# Patient Record
Sex: FEMALE | Race: WHITE | NOT HISPANIC OR LATINO | Employment: OTHER | URBAN - METROPOLITAN AREA
[De-identification: names, ages, dates, MRNs, and addresses within clinical notes are randomized per-mention and may not be internally consistent; named-entity substitution may affect disease eponyms.]

---

## 2017-01-19 ENCOUNTER — HOSPITAL ENCOUNTER (INPATIENT)
Facility: MEDICAL CENTER | Age: 82
LOS: 4 days | DRG: 551 | End: 2017-01-24
Attending: EMERGENCY MEDICINE | Admitting: INTERNAL MEDICINE
Payer: MEDICARE

## 2017-01-19 ENCOUNTER — APPOINTMENT (OUTPATIENT)
Dept: RADIOLOGY | Facility: MEDICAL CENTER | Age: 82
DRG: 551 | End: 2017-01-19
Attending: EMERGENCY MEDICINE
Payer: MEDICARE

## 2017-01-19 DIAGNOSIS — N28.9 RENAL INSUFFICIENCY: ICD-10-CM

## 2017-01-19 DIAGNOSIS — R09.02 HYPOXIA: ICD-10-CM
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
## 2017-01-19 DIAGNOSIS — M54.9 PAIN, UPPER BACK: ICD-10-CM

## 2017-01-19 LAB
ALBUMIN SERPL BCP-MCNC: 3.9 G/DL (ref 3.2–4.9)
ALBUMIN/GLOB SERPL: 1.2 G/DL
ALP SERPL-CCNC: 81 U/L (ref 30–99)
ALT SERPL-CCNC: 17 U/L (ref 2–50)
ANION GAP SERPL CALC-SCNC: 10 MMOL/L (ref 0–11.9)
APTT PPP: 27.5 SEC (ref 24.7–36)
AST SERPL-CCNC: 35 U/L (ref 12–45)
BASOPHILS # BLD AUTO: 0.3 % (ref 0–1.8)
BASOPHILS # BLD: 0.03 K/UL (ref 0–0.12)
BILIRUB SERPL-MCNC: 0.5 MG/DL (ref 0.1–1.5)
BUN SERPL-MCNC: 23 MG/DL (ref 8–22)
CALCIUM SERPL-MCNC: 9.1 MG/DL (ref 8.5–10.5)
CHLORIDE SERPL-SCNC: 101 MMOL/L (ref 96–112)
CO2 SERPL-SCNC: 25 MMOL/L (ref 20–33)
CREAT SERPL-MCNC: 1.42 MG/DL (ref 0.5–1.4)
EOSINOPHIL # BLD AUTO: 0.03 K/UL (ref 0–0.51)
EOSINOPHIL NFR BLD: 0.3 % (ref 0–6.9)
ERYTHROCYTE [DISTWIDTH] IN BLOOD BY AUTOMATED COUNT: 45 FL (ref 35.9–50)
GFR SERPL CREATININE-BSD FRML MDRD: 35 ML/MIN/1.73 M 2
GLOBULIN SER CALC-MCNC: 3.3 G/DL (ref 1.9–3.5)
GLUCOSE SERPL-MCNC: 183 MG/DL (ref 65–99)
HCT VFR BLD AUTO: 42.4 % (ref 37–47)
HGB BLD-MCNC: 14.6 G/DL (ref 12–16)
IMM GRANULOCYTES # BLD AUTO: 0.02 K/UL (ref 0–0.11)
IMM GRANULOCYTES NFR BLD AUTO: 0.2 % (ref 0–0.9)
INR PPP: 0.99 (ref 0.87–1.13)
LYMPHOCYTES # BLD AUTO: 0.63 K/UL (ref 1–4.8)
LYMPHOCYTES NFR BLD: 7.1 % (ref 22–41)
MCH RBC QN AUTO: 32.2 PG (ref 27–33)
MCHC RBC AUTO-ENTMCNC: 34.4 G/DL (ref 33.6–35)
MCV RBC AUTO: 93.4 FL (ref 81.4–97.8)
MONOCYTES # BLD AUTO: 0.61 K/UL (ref 0–0.85)
MONOCYTES NFR BLD AUTO: 6.8 % (ref 0–13.4)
NEUTROPHILS # BLD AUTO: 7.59 K/UL (ref 2–7.15)
NEUTROPHILS NFR BLD: 85.3 % (ref 44–72)
NRBC # BLD AUTO: 0 K/UL
NRBC BLD AUTO-RTO: 0 /100 WBC
PLATELET # BLD AUTO: 229 K/UL (ref 164–446)
PMV BLD AUTO: 8.8 FL (ref 9–12.9)
POTASSIUM SERPL-SCNC: 4.7 MMOL/L (ref 3.6–5.5)
PROT SERPL-MCNC: 7.2 G/DL (ref 6–8.2)
PROTHROMBIN TIME: 13.4 SEC (ref 12–14.6)
RBC # BLD AUTO: 4.54 M/UL (ref 4.2–5.4)
SODIUM SERPL-SCNC: 136 MMOL/L (ref 135–145)
WBC # BLD AUTO: 8.9 K/UL (ref 4.8–10.8)

## 2017-01-19 PROCEDURE — 99285 EMERGENCY DEPT VISIT HI MDM: CPT

## 2017-01-19 PROCEDURE — 96360 HYDRATION IV INFUSION INIT: CPT

## 2017-01-19 PROCEDURE — 304561 HCHG STAT O2

## 2017-01-19 PROCEDURE — 85730 THROMBOPLASTIN TIME PARTIAL: CPT

## 2017-01-19 PROCEDURE — 85610 PROTHROMBIN TIME: CPT

## 2017-01-19 PROCEDURE — 80053 COMPREHEN METABOLIC PANEL: CPT

## 2017-01-19 PROCEDURE — 85025 COMPLETE CBC W/AUTO DIFF WBC: CPT

## 2017-01-19 PROCEDURE — 36415 COLL VENOUS BLD VENIPUNCTURE: CPT

## 2017-01-19 PROCEDURE — 700105 HCHG RX REV CODE 258: Performed by: EMERGENCY MEDICINE

## 2017-01-19 PROCEDURE — 85379 FIBRIN DEGRADATION QUANT: CPT

## 2017-01-19 PROCEDURE — 94760 N-INVAS EAR/PLS OXIMETRY 1: CPT

## 2017-01-19 PROCEDURE — 304562 HCHG STAT O2 MASK/CANNULA

## 2017-01-19 RX ORDER — SODIUM CHLORIDE 9 MG/ML
500 INJECTION, SOLUTION INTRAVENOUS ONCE
Status: COMPLETED | OUTPATIENT
Start: 2017-01-19 | End: 2017-01-19

## 2017-01-19 RX ADMIN — SODIUM CHLORIDE 500 ML: 9 INJECTION, SOLUTION INTRAVENOUS at 23:00

## 2017-01-19 ASSESSMENT — LIFESTYLE VARIABLES: DO YOU DRINK ALCOHOL: NO

## 2017-01-19 ASSESSMENT — PAIN SCALES - GENERAL
PAINLEVEL_OUTOF10: 0
PAINLEVEL_OUTOF10: 0

## 2017-01-19 NOTE — IP AVS SNAPSHOT
First Active Media Access Code: [email or web address]  Expires: 2/23/2017  6:19 PM    First Active Media  A secure, online tool to manage your health information     [employer]’s First Active Media® is a secure, online tool that connects you to your personalized health information from the privacy of your home -- day or night - making it very easy for you to manage your healthcare. Once the activation process is completed, you can even access your medical information using the First Active Media bill, which is available for free in the Apple Bill store or Google Play store.     First Active Media provides the following levels of access (as shown below):   My Chart Features   [facility] Primary Care Doctor [facility]  Specialists [facility]  Urgent  Care Non-[facility]  Primary Care  Doctor   Email your healthcare team securely and privately 24/7 X X X X   Manage appointments: schedule your next appointment; view details of past/upcoming appointments X      Request prescription refills. X      View recent personal medical records, including lab and immunizations X X X X   View health record, including health history, allergies, medications X X X X   Read reports about your outpatient visits, procedures, consult and ER notes X X X X   See your discharge summary, which is a recap of your hospital and/or ER visit that includes your diagnosis, lab results, and care plan. X X       How to register for First Active Media:  1. Go to  [email or web address].  2. Click on the Sign Up Now box, which takes you to the New Member Sign Up page. You will need to provide the following information:  a. Enter your First Active Media Access Code exactly as it appears at the top of this page. (You will not need to use this code after you’ve completed the sign-up process. If you do not sign up before the expiration date, you must request a new code.)   b. Enter your date of birth.   c. Enter your home email address.   d. Click Submit, and follow the next screen’s instructions.  3. Create a First Active Media ID. This will be your [email or web address]  login ID and cannot be changed, so think of one that is secure and easy to remember.  4. Create a [employer] password. You can change your password at any time.  5. Enter your Password Reset Question and Answer. This can be used at a later time if you forget your password.   6. Enter your e-mail address. This allows you to receive e-mail notifications when new information is available in [employer].  7. Click Sign Up. You can now view your health information.    For assistance activating your [employer] account, call [phone]

## 2017-01-19 NOTE — IP AVS SNAPSHOT
" After Visit Summary                                                                                                                  Name:Pau Escalera  Medical Record Number:[id number]  CSN: [id number]    YOB: 1926   Age: 89 y.o.  Sex: female  HT:1.499 m (4' 11.02\") WT: 64.2 kg (141 lb 8.6 oz)          Admit Date: 1/19/2017     Discharge Date: 1/24/2017  Today's Date: 1/25/2017  Attending Doctor:  No att. providers found                  Allergies:  Review of patient's allergies indicates no known allergies.            Discharge Instructions       Discharge Instructions    Discharged to other by [facility] with escort. Discharged via wheelchair, hospital escort: Yes.  Special equipment needed: Not Applicable    Be sure to schedule a follow-up appointment with your primary care doctor or any specialists as instructed.     Discharge Plan:   Influenza Vaccine Indication: Patient Refuses    I understand that a diet low in cholesterol, fat, and sodium is recommended for good health. Unless I have been given specific instructions below for another diet, I accept this instruction as my diet prescription.   Other diet: regular diet    Special Instructions: None    · Is patient discharged on Warfarin / Coumadin?   No     · Is patient Post Blood Transfusion?  No    Depression / Suicide Risk    As you are discharged from this [facility] facility, it is important to learn how to keep safe from harming yourself.    Recognize the warning signs:  · Abrupt changes in personality, positive or negative- including increase in energy   · Giving away possessions  · Change in eating patterns- significant weight changes-  positive or negative  · Change in sleeping patterns- unable to sleep or sleeping all the time   · Unwillingness or inability to communicate  · Depression  · Unusual sadness, discouragement and loneliness  · Talk of wanting to die  · Neglect of personal appearance   · Rebelliousness- reckless " behavior  · Withdrawal from people/activities they love  · Confusion- inability to concentrate     If you or a loved one observes any of these behaviors or has concerns about self-harm, here's what you can do:  · Talk about it- your feelings and reasons for harming yourself  · Remove any means that you might use to hurt yourself (examples: pills, rope, extension cords, firearm)  · Get professional help from the community (Mental Health, Substance Abuse, psychological counseling)  · Do not be alone:Call your Safe Contact- someone whom you trust who will be there for you.  · Call your local CRISIS HOTLINE 357-1386 or 963-865-9930  · Call your local Children's Mobile Crisis Response Team [place] (246) 116-9427 or www.[employer]  · Call the toll free National Suicide Prevention Hotlines   · National Suicide Prevention Lifeline 066-614-CQAE (7677)  · [employer] Network 800-SUICIDE (471-1933)        Your appointments     Jan 25, 2017  8:15 AM   Adult Draw/Collection with LAB SKILLED NURSING   LAB - SKILLED NURSING (--)    [address] [address] [phone]              Follow-up Information     1. Follow up with Thong Alvarez D.O. In 4 weeks.    Specialty:  Family Medicine    Why:  Hospital follow-up appointment with PCP    Contact information    [address]   [address]  [facility] [address] [phone]          2. Follow up with Markus Wong M.D. In 2 weeks.    Specialty:  Neurosurgery    Contact information    [address] [facility]  [address]  [facility] [id number] [phone]           Discharge Medication Instructions:    Below are the medications your physician expects you to take upon discharge:    Review all your home medications and newly ordered medications with your doctor and/or pharmacist. Follow medication instructions as directed by your doctor and/or pharmacist.    Please keep your medication list with you and share with your physician.               Medication List      START  taking these medications        Instructions     MG Caps   Last time this was given:  100 mg on 1/24/2017  8:49 AM   Next Dose Due:  Tomorrow am with breakfast    Take 100 mg by mouth every morning.   Dose:  100 mg       gabapentin 100 MG Caps   Last time this was given:  100 mg on 1/24/2017  2:59 PM   Commonly known as:  NEURONTIN   Next Dose Due:  Tonight at bedtime    Take 1 Cap by mouth 3 times a day.   Dose:  100 mg       lactulose 20 GM/30ML Soln   Last time this was given:  30 mL on 1/22/2017  9:14 PM   Next Dose Due:  See instruction lactulose followed by suppository and then by fleets enema if no BM this is protocol here if colace and Senakot do not work    Take 30 mL by mouth every 24 hours as needed (if sennosides-docusate sodium (SENOKOT-S) ineffective).   Dose:  30 mL       senna-docusate 8.6-50 MG Tabs   Last time this was given:  1 Tab on 1/23/2017  9:42 PM   Commonly known as:  PERICOLACE or SENOKOT S   Next Dose Due:  Tonight at bedtime    Take 1 Tab by mouth every day.   Dose:  1 Tab         CONTINUE taking these medications        Instructions    acetaminophen 500 MG Tabs   Last time this was given:  650 mg on 1/22/2017  3:25 AM   Commonly known as:  TYLENOL   Next Dose Due:  As needed for pain (rt shoulder has been hurting but she declined tylenol offered)    Take 500 mg by mouth every four hours as needed (generlized pain).   Dose:  500 mg       artificial tears 1.4 % Soln   Next Dose Due:  Out-pt med was not ordered in hospital    Place 1 Drop in both eyes 2 Times a Day.   Dose:  1 Drop       clopidogrel 75 MG Tabs   Last time this was given:  75 mg on 1/24/2017  8:49 AM   Commonly known as:  PLAVIX   Next Dose Due:  Due again in am with breakfast    Take 75 mg by mouth every morning.   Dose:  75 mg       magnesium oxide 400 MG Tabs   Commonly known as:  MAG-OX   Next Dose Due:  Out pt med not given in hospital    Take 400 mg by mouth every evening.   Dose:  400 mg       simvastatin 40  MG Tabs   Last time this was given:  40 mg on 1/23/2017  9:42 PM   Commonly known as:  ZOCOR   Next Dose Due:  Due again tonight at bedtime    Take 40 mg by mouth every evening.   Dose:  40 mg       tramadol 50 MG Tabs   Commonly known as:  ULTRAM   Next Dose Due:  As needed for pain has declined pain meds so far today    Take 50 mg by mouth every 6 hours as needed for Moderate Pain.   Dose:  50 mg       VIACTIV 500-500-40 MG-UNT-MCG Chew   Generic drug:  Calcium-Vitamin D-Vitamin K   Next Dose Due:  See below this too was not ordered in hospital    Take 1 Tab by mouth every evening.   Dose:  1 Tab         STOP taking these medications     CALCIUM + D PO               Instructions           Diet / Nutrition:    Follow any diet instructions given to you by your doctor or the dietician, including how much salt (sodium) you are allowed each day.    If you are overweight, talk to your doctor about a weight reduction plan.    Activity:    Remain physically active following your doctor's instructions about exercise and activity.    Rest often.     Any time you become even a little tired or short of breath, SIT DOWN and rest.    Worsening Symptoms:    Report any of the following signs and symptoms to the doctor's office immediately:    *Pain of jaw, arm, or neck  *Chest pain not relieved by medication                               *Dizziness or loss of consciousness  *Difficulty breathing even when at rest   *More tired than usual                                       *Bleeding drainage or swelling of surgical site  *Swelling of feet, ankles, legs or stomach                 *Fever (>100ºF)  *Pink or blood tinged sputum  *Weight gain (3lbs/day or 5lbs /week)           *Shock from internal defibrillator (if applicable)  *Palpitations or irregular heartbeats                *Cool and/or numb extremities    Stroke Awareness    Common Risk Factors for Stroke include:    Age  Atrial Fibrillation  Carotid Artery Stenosis  Diabetes  Mellitus  Excessive alcohol consumption  High blood pressure  Overweight   Physical inactivity  Smoking    Warning signs and symptoms of a stroke include:    *Sudden numbness or weakness of the face, arm or leg (especially on one side of the body).  *Sudden confusion, trouble speaking or understanding.  *Sudden trouble seeing in one or both eyes.  *Sudden trouble walking, dizziness, loss of balance or coordination.Sudden severe headache with no known cause.    It is very important to get treatment quickly when a stroke occurs. If you experience any of the above warning signs, call 911 immediately.                   Disclaimer         Quit Smoking / Tobacco Use:    I understand the use of any tobacco products increases my chance of suffering from future heart disease or stroke and could cause other illnesses which may shorten my life. Quitting the use of tobacco products is the single most important thing I can do to improve my health. For further information on smoking / tobacco cessation call a Toll Free Quit Line at 1-353.126.6727 (*National Cancer [employer]) or 1-590.878.9793 (American Lung Association) or you can access the web based program at www.lungQualMetrix.org.    [place] Tobacco Users Help Line:  (518) 399-8957       Toll Free: 1-778.780.3209  Quit Tobacco Program [facility] (011)274-9730    Crisis Hotline:    [place] Crisis Hotline:  4-905-CLRYLHD or 1-494.661.2412    [place] Crisis Hotline:    1-362.828.9723 or 423-565-4150    Discharge Survey:   Thank you for choosing [facility]. We hope we did everything we could to make your hospital stay a pleasant one. You may be receiving a phone survey and we would appreciate your time and participation in answering the questions. Your input is very valuable to us in our efforts to improve our service to our patients and their families.        My signature on this form indicates that:    1. I have reviewed and understand the above  information.  2. My questions regarding this information have been answered to my satisfaction.  3. I have formulated a plan with my discharge nurse to obtain my prescribed medications for home.                  Disclaimer         __________________________________                     __________       ________                       Patient Signature                                                 Date                    Time

## 2017-01-19 NOTE — IP AVS SNAPSHOT
1/25/2017          [address] [place] [address]    Dear Pau:    [facility] wants to ensure your discharge home is safe and you or your loved ones have had all your questions answered regarding your care after you leave the hospital.    You may receive a telephone call within two days of your discharge.  This call is to make certain you understand your discharge instructions as well as ensure we provided you with the best care possible during your stay with us.     The call will only last approximately 3-5 minutes and will be done by a nurse.    Once again, we want to ensure your discharge home is safe and that you have a clear understanding of any next steps in your care.  If you have any questions or concerns, please do not hesitate to contact us, we are here for you.  Thank you for choosing [facility] [place] for your healthcare needs.    Sincerely,    Epi Malin    [facility]

## 2017-01-19 NOTE — IP AVS SNAPSHOT
" <p align=\"LEFT\"><IMG SRC=\"//EMRWB/blob$/Images/[email or web address]\" alt=\"Image\" WIDTH=\"50%\" HEIGHT=\"200\" BORDER=\"\"></p>                   Name:Pau Escalera  Medical Record Number:[id number]  CSN: [id number]    YOB: 1926   Age: 89 y.o.  Sex: female  HT:1.499 m (4' 11.02\") WT: 64.2 kg (141 lb 8.6 oz)          Admit Date: 1/19/2017     Discharge Date: 1/24/2017  Today's Date: 1/25/2017  Attending Doctor:  No att. providers found                  Allergies:  Review of patient's allergies indicates no known allergies.          Your appointments     Jan 25, 2017  8:15 AM   Adult Draw/Collection with LAB SKILLED NURSING   LAB - SKILLED NURSING (--)    [address] [facility] [id number] [phone]              Follow-up Information     1. Follow up with Thong Alvarez D.O. In 4 weeks.    Specialty:  Family Medicine    Why:  Hospital follow-up appointment with PCP    Contact information    [address]   [address]  [facility] [id number] [phone]          2. Follow up with Markus Wong M.D. In 2 weeks.    Specialty:  Neurosurgery    Contact information    [address] [facility]  [address]  [facility] [id number] [phone]           Medication List      Take these Medications        Instructions    acetaminophen 500 MG Tabs   Commonly known as:  TYLENOL    Take 500 mg by mouth every four hours as needed (generlized pain).   Dose:  500 mg       artificial tears 1.4 % Soln    Place 1 Drop in both eyes 2 Times a Day.   Dose:  1 Drop       clopidogrel 75 MG Tabs   Commonly known as:  PLAVIX    Take 75 mg by mouth every morning.   Dose:  75 mg        MG Caps    Take 100 mg by mouth every morning.   Dose:  100 mg       gabapentin 100 MG Caps   Commonly known as:  NEURONTIN    Take 1 Cap by mouth 3 times a day.   Dose:  100 mg       lactulose 20 GM/30ML Soln    Take 30 mL by mouth every 24 hours as needed (if sennosides-docusate sodium (SENOKOT-S) ineffective).   Dose:  30 mL       magnesium oxide 400 MG Tabs   Commonly " known as:  MAG-OX    Take 400 mg by mouth every evening.   Dose:  400 mg       senna-docusate 8.6-50 MG Tabs   Commonly known as:  PERICOLACE or SENOKOT S    Take 1 Tab by mouth every day.   Dose:  1 Tab       simvastatin 40 MG Tabs   Commonly known as:  ZOCOR    Take 40 mg by mouth every evening.   Dose:  40 mg       tramadol 50 MG Tabs   Commonly known as:  ULTRAM    Take 50 mg by mouth every 6 hours as needed for Moderate Pain.   Dose:  50 mg       VIACTIV 500-500-40 MG-UNT-MCG Chew   Generic drug:  Calcium-Vitamin D-Vitamin K    Take 1 Tab by mouth every evening.   Dose:  1 Tab

## 2017-01-20 ENCOUNTER — RESOLUTE PROFESSIONAL BILLING HOSPITAL PROF FEE (OUTPATIENT)
Dept: HOSPITALIST | Facility: MEDICAL CENTER | Age: 82
End: 2017-01-20
Payer: MEDICARE

## 2017-01-20 ENCOUNTER — APPOINTMENT (OUTPATIENT)
Dept: RADIOLOGY | Facility: MEDICAL CENTER | Age: 82
DRG: 551 | End: 2017-01-20
Attending: INTERNAL MEDICINE
Payer: MEDICARE
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
## 2017-01-20 PROBLEM — J96.01 ACUTE RESPIRATORY FAILURE WITH HYPOXIA (HCC): Status: ACTIVE | Noted: 2017-01-20

## 2017-01-20 LAB
DEPRECATED D DIMER PPP IA-ACNC: 553 NG/ML(D-DU)
FLUAV H1 2009 PAND RNA SPEC QL NAA+PROBE: NOT DETECTED
FLUAV RNA SPEC QL NAA+PROBE: NEGATIVE
FLUBV RNA SPEC QL NAA+PROBE: NEGATIVE

## 2017-01-20 PROCEDURE — G8988 SELF CARE GOAL STATUS: HCPCS | Mod: CK

## 2017-01-20 PROCEDURE — 700111 HCHG RX REV CODE 636 W/ 250 OVERRIDE (IP): Performed by: INTERNAL MEDICINE

## 2017-01-20 PROCEDURE — 700105 HCHG RX REV CODE 258: Performed by: INTERNAL MEDICINE

## 2017-01-20 PROCEDURE — A9567 TECHNETIUM TC-99M AEROSOL: HCPCS

## 2017-01-20 PROCEDURE — 97166 OT EVAL MOD COMPLEX 45 MIN: CPT

## 2017-01-20 PROCEDURE — 72070 X-RAY EXAM THORAC SPINE 2VWS: CPT

## 2017-01-20 PROCEDURE — 700102 HCHG RX REV CODE 250 W/ 637 OVERRIDE(OP): Performed by: INTERNAL MEDICINE

## 2017-01-20 PROCEDURE — 93970 EXTREMITY STUDY: CPT

## 2017-01-20 PROCEDURE — 700112 HCHG RX REV CODE 229: Performed by: INTERNAL MEDICINE

## 2017-01-20 PROCEDURE — A9270 NON-COVERED ITEM OR SERVICE: HCPCS | Performed by: INTERNAL MEDICINE

## 2017-01-20 PROCEDURE — 99223 1ST HOSP IP/OBS HIGH 75: CPT | Mod: AI | Performed by: INTERNAL MEDICINE

## 2017-01-20 PROCEDURE — 87502 INFLUENZA DNA AMP PROBE: CPT

## 2017-01-20 PROCEDURE — G8978 MOBILITY CURRENT STATUS: HCPCS | Mod: CL

## 2017-01-20 PROCEDURE — 72100 X-RAY EXAM L-S SPINE 2/3 VWS: CPT

## 2017-01-20 PROCEDURE — 770006 HCHG ROOM/CARE - MED/SURG/GYN SEMI*

## 2017-01-20 PROCEDURE — 97163 PT EVAL HIGH COMPLEX 45 MIN: CPT

## 2017-01-20 PROCEDURE — 87503 INFLUENZA DNA AMP PROB ADDL: CPT

## 2017-01-20 PROCEDURE — G8987 SELF CARE CURRENT STATUS: HCPCS | Mod: CL

## 2017-01-20 PROCEDURE — 99232 SBSQ HOSP IP/OBS MODERATE 35: CPT | Performed by: INTERNAL MEDICINE

## 2017-01-20 PROCEDURE — 96361 HYDRATE IV INFUSION ADD-ON: CPT

## 2017-01-20 PROCEDURE — 700105 HCHG RX REV CODE 258: Performed by: EMERGENCY MEDICINE

## 2017-01-20 PROCEDURE — G8979 MOBILITY GOAL STATUS: HCPCS | Mod: CK

## 2017-01-20 PROCEDURE — 71250 CT THORAX DX C-: CPT

## 2017-01-20 PROCEDURE — 93970 EXTREMITY STUDY: CPT | Mod: 26 | Performed by: SURGERY

## 2017-01-20 RX ORDER — ENEMA 19; 7 G/133ML; G/133ML
1 ENEMA RECTAL
Status: DISCONTINUED | OUTPATIENT
Start: 2017-01-20 | End: 2017-01-24 | Stop reason: HOSPADM

## 2017-01-20 RX ORDER — ONDANSETRON 2 MG/ML
4 INJECTION INTRAMUSCULAR; INTRAVENOUS EVERY 4 HOURS PRN
Status: DISCONTINUED | OUTPATIENT
Start: 2017-01-20 | End: 2017-01-24 | Stop reason: HOSPADM

## 2017-01-20 RX ORDER — TRAMADOL HYDROCHLORIDE 50 MG/1
50 TABLET ORAL EVERY 6 HOURS PRN
Status: ON HOLD | COMMUNITY
End: 2017-01-20

## 2017-01-20 RX ORDER — POLYVINYL ALCOHOL 14 MG/ML
1 SOLUTION/ DROPS OPHTHALMIC 2 TIMES DAILY
Status: ON HOLD | COMMUNITY
End: 2017-01-20

## 2017-01-20 RX ORDER — GUAIFENESIN 600 MG/1
600 TABLET, EXTENDED RELEASE ORAL
Status: ON HOLD | COMMUNITY
End: 2017-01-20

## 2017-01-20 RX ORDER — SIMVASTATIN 20 MG
40 TABLET ORAL DAILY
Status: DISCONTINUED | OUTPATIENT
Start: 2017-01-20 | End: 2017-01-24 | Stop reason: HOSPADM

## 2017-01-20 RX ORDER — KETOTIFEN FUMARATE 0.25 MG/ML
1 SOLUTION/ DROPS OPHTHALMIC 2 TIMES DAILY PRN
Status: ON HOLD | COMMUNITY
End: 2017-01-20

## 2017-01-20 RX ORDER — SODIUM CHLORIDE 9 MG/ML
INJECTION, SOLUTION INTRAVENOUS CONTINUOUS
Status: DISCONTINUED | OUTPATIENT
Start: 2017-01-20 | End: 2017-01-22

## 2017-01-20 RX ORDER — CLOPIDOGREL BISULFATE 75 MG/1
75 TABLET ORAL DAILY
Status: DISCONTINUED | OUTPATIENT
Start: 2017-01-20 | End: 2017-01-24 | Stop reason: HOSPADM

## 2017-01-20 RX ORDER — HEPARIN SODIUM 5000 [USP'U]/ML
5000 INJECTION, SOLUTION INTRAVENOUS; SUBCUTANEOUS EVERY 8 HOURS
Status: DISCONTINUED | OUTPATIENT
Start: 2017-01-20 | End: 2017-01-24 | Stop reason: HOSPADM

## 2017-01-20 RX ORDER — DOCUSATE SODIUM 100 MG/1
100 CAPSULE, LIQUID FILLED ORAL EVERY MORNING
Status: DISCONTINUED | OUTPATIENT
Start: 2017-01-20 | End: 2017-01-24 | Stop reason: HOSPADM

## 2017-01-20 RX ORDER — GABAPENTIN 100 MG/1
100 CAPSULE ORAL 3 TIMES DAILY
Status: DISCONTINUED | OUTPATIENT
Start: 2017-01-20 | End: 2017-01-24 | Stop reason: HOSPADM

## 2017-01-20 RX ORDER — CLOPIDOGREL BISULFATE 75 MG/1
75 TABLET ORAL EVERY MORNING
COMMUNITY
End: 2020-02-13

## 2017-01-20 RX ORDER — MAGNESIUM OXIDE 400 MG/1
400 TABLET ORAL DAILY
Status: ON HOLD | COMMUNITY
End: 2017-01-20

## 2017-01-20 RX ORDER — IBUPROFEN 200 MG
600 TABLET ORAL EVERY 6 HOURS PRN
Status: ON HOLD | COMMUNITY
End: 2017-01-24

## 2017-01-20 RX ORDER — LACTULOSE 20 G/30ML
30 SOLUTION ORAL
Status: DISCONTINUED | OUTPATIENT
Start: 2017-01-20 | End: 2017-01-24 | Stop reason: HOSPADM

## 2017-01-20 RX ORDER — IBUPROFEN 200 MG
600 TABLET ORAL EVERY 6 HOURS PRN
Status: ON HOLD | COMMUNITY
End: 2017-01-20

## 2017-01-20 RX ORDER — LABETALOL HYDROCHLORIDE 5 MG/ML
10 INJECTION, SOLUTION INTRAVENOUS EVERY 4 HOURS PRN
Status: DISCONTINUED | OUTPATIENT
Start: 2017-01-20 | End: 2017-01-24 | Stop reason: HOSPADM

## 2017-01-20 RX ORDER — TRAMADOL HYDROCHLORIDE 50 MG/1
50 TABLET ORAL EVERY 6 HOURS PRN
Status: ON HOLD | COMMUNITY
End: 2020-02-13 | Stop reason: SDUPTHER

## 2017-01-20 RX ORDER — TEMAZEPAM 15 MG/1
15 CAPSULE ORAL NIGHTLY PRN
Status: ON HOLD | COMMUNITY
End: 2017-01-20

## 2017-01-20 RX ORDER — AMOXICILLIN 250 MG
1 CAPSULE ORAL NIGHTLY
Status: DISCONTINUED | OUTPATIENT
Start: 2017-01-20 | End: 2017-01-24 | Stop reason: HOSPADM

## 2017-01-20 RX ORDER — ONDANSETRON 4 MG/1
4 TABLET, ORALLY DISINTEGRATING ORAL EVERY 4 HOURS PRN
Status: DISCONTINUED | OUTPATIENT
Start: 2017-01-20 | End: 2017-01-24 | Stop reason: HOSPADM

## 2017-01-20 RX ORDER — BISACODYL 5 MG
10 TABLET, DELAYED RELEASE (ENTERIC COATED) ORAL
Status: ON HOLD | COMMUNITY
End: 2017-01-20

## 2017-01-20 RX ORDER — ACETAMINOPHEN 500 MG
500 TABLET ORAL EVERY 4 HOURS PRN
COMMUNITY
End: 2020-02-13

## 2017-01-20 RX ORDER — SIMVASTATIN 40 MG
40 TABLET ORAL
COMMUNITY
End: 2020-02-13

## 2017-01-20 RX ORDER — ACETAMINOPHEN 325 MG/1
500 TABLET ORAL EVERY 4 HOURS PRN
Status: ON HOLD | COMMUNITY
End: 2017-01-20

## 2017-01-20 RX ORDER — MAGNESIUM OXIDE 400 MG/1
400 TABLET ORAL EVERY EVENING
COMMUNITY
Start: 2020-02-13

## 2017-01-20 RX ORDER — AMOXICILLIN 250 MG
1 CAPSULE ORAL
Status: DISCONTINUED | OUTPATIENT
Start: 2017-01-20 | End: 2017-01-24 | Stop reason: HOSPADM

## 2017-01-20 RX ORDER — CRANBERRY FRUIT EXTRACT 250 MG
1 CAPSULE ORAL 2 TIMES DAILY
Status: ON HOLD | COMMUNITY
End: 2017-01-24

## 2017-01-20 RX ORDER — POLYVINYL ALCOHOL 14 MG/ML
1 SOLUTION/ DROPS OPHTHALMIC 3 TIMES DAILY PRN
COMMUNITY
Start: 2020-02-13

## 2017-01-20 RX ORDER — SODIUM CHLORIDE 9 MG/ML
1000 INJECTION, SOLUTION INTRAVENOUS ONCE
Status: COMPLETED | OUTPATIENT
Start: 2017-01-20 | End: 2017-01-20

## 2017-01-20 RX ORDER — BISACODYL 10 MG
10 SUPPOSITORY, RECTAL RECTAL
Status: DISCONTINUED | OUTPATIENT
Start: 2017-01-20 | End: 2017-01-24 | Stop reason: HOSPADM

## 2017-01-20 RX ORDER — ACETAMINOPHEN 325 MG/1
650 TABLET ORAL EVERY 6 HOURS PRN
Status: DISCONTINUED | OUTPATIENT
Start: 2017-01-20 | End: 2017-01-24 | Stop reason: HOSPADM

## 2017-01-20 RX ADMIN — ACETAMINOPHEN 650 MG: 325 TABLET, FILM COATED ORAL at 21:16

## 2017-01-20 RX ADMIN — SODIUM CHLORIDE 1000 ML: 9 INJECTION, SOLUTION INTRAVENOUS at 01:45

## 2017-01-20 RX ADMIN — HEPARIN SODIUM 5000 UNITS: 5000 INJECTION INTRAVENOUS; SUBCUTANEOUS at 14:57

## 2017-01-20 RX ADMIN — GABAPENTIN 100 MG: 100 CAPSULE ORAL at 12:16

## 2017-01-20 RX ADMIN — GABAPENTIN 100 MG: 100 CAPSULE ORAL at 20:52

## 2017-01-20 RX ADMIN — HEPARIN SODIUM 5000 UNITS: 5000 INJECTION INTRAVENOUS; SUBCUTANEOUS at 08:36

## 2017-01-20 RX ADMIN — CLOPIDOGREL 75 MG: 75 TABLET, FILM COATED ORAL at 08:35

## 2017-01-20 RX ADMIN — DOCUSATE SODIUM 100 MG: 100 CAPSULE ORAL at 08:35

## 2017-01-20 RX ADMIN — ACETAMINOPHEN 650 MG: 325 TABLET, FILM COATED ORAL at 11:52

## 2017-01-20 RX ADMIN — SODIUM CHLORIDE: 9 INJECTION, SOLUTION INTRAVENOUS at 08:37

## 2017-01-20 RX ADMIN — SIMVASTATIN 40 MG: 20 TABLET, FILM COATED ORAL at 20:52

## 2017-01-20 RX ADMIN — STANDARDIZED SENNA CONCENTRATE AND DOCUSATE SODIUM 1 TABLET: 8.6; 5 TABLET, FILM COATED ORAL at 20:52

## 2017-01-20 RX ADMIN — HEPARIN SODIUM 5000 UNITS: 5000 INJECTION INTRAVENOUS; SUBCUTANEOUS at 20:52

## 2017-01-20 ASSESSMENT — PATIENT HEALTH QUESTIONNAIRE - PHQ9
2. FEELING DOWN, DEPRESSED, IRRITABLE, OR HOPELESS: NOT AT ALL
SUM OF ALL RESPONSES TO PHQ QUESTIONS 1-9: 0
SUM OF ALL RESPONSES TO PHQ9 QUESTIONS 1 AND 2: 0
SUM OF ALL RESPONSES TO PHQ9 QUESTIONS 1 AND 2: 0
SUM OF ALL RESPONSES TO PHQ QUESTIONS 1-9: 0
1. LITTLE INTEREST OR PLEASURE IN DOING THINGS: NOT AT ALL
2. FEELING DOWN, DEPRESSED, IRRITABLE, OR HOPELESS: NOT AT ALL
1. LITTLE INTEREST OR PLEASURE IN DOING THINGS: NOT AT ALL

## 2017-01-20 ASSESSMENT — ACTIVITIES OF DAILY LIVING (ADL): TOILETING: DEPENDENT

## 2017-01-20 ASSESSMENT — LIFESTYLE VARIABLES
EVER_SMOKED: NEVER
EVER_SMOKED: NEVER
DO YOU DRINK ALCOHOL: NO
ALCOHOL_USE: NO
EVER_SMOKED: NEVER

## 2017-01-20 ASSESSMENT — ENCOUNTER SYMPTOMS
SHORTNESS OF BREATH: 1
BACK PAIN: 1
DIZZINESS: 0
CHILLS: 0
HEADACHES: 0
FEVER: 0
PALPITATIONS: 0
COUGH: 0
ABDOMINAL PAIN: 0
DIAPHORESIS: 0
NAUSEA: 0

## 2017-01-20 ASSESSMENT — COPD QUESTIONNAIRES
HAVE YOU SMOKED AT LEAST 100 CIGARETTES IN YOUR ENTIRE LIFE: NO/DON'T KNOW
DURING THE PAST 4 WEEKS HOW MUCH DID YOU FEEL SHORT OF BREATH: NONE/LITTLE OF THE TIME
DO YOU EVER COUGH UP ANY MUCUS OR PHLEGM?: NO/ONLY WITH OCCASIONAL COLDS OR INFECTIONS
COPD SCREENING SCORE: 2

## 2017-01-20 ASSESSMENT — GAIT ASSESSMENTS: GAIT LEVEL OF ASSIST: UNABLE TO PARTICIPATE

## 2017-01-20 ASSESSMENT — PAIN SCALES - GENERAL: PAINLEVEL_OUTOF10: 4

## 2017-01-20 NOTE — ED NOTES
Floor called and notified of transport.  Pau Lali transported to S1 via gurney with transport. All personal belongings in possession.  NAD noted.

## 2017-01-20 NOTE — ED PROVIDER NOTES
"ED Provider Note    Scribed for Samy Gale M.D. by Aaliyah Hernandez. 1/19/2017  10:30 PM    Primary care provider: Thong Alvarez D.O.  Means of arrival: EMS  History obtained from: Patient/ Nursing  History limited by: None    CHIEF COMPLAINT  Chief Complaint   Patient presents with   • GLF     Pt non-ambulatory, fell out of chair while brushing teeth this evening.   • Back Pain     left flank pain.     RICHARD Escalera is a 89 y.o. female who presents to the Emergency Department by ambulance following a ground level fall which occurred prior to arrival. Per nursing, the patient fell out of her wheel chair while brushing her teeth this evening. She initially complained of left flank pain to EMS. She denies hitting her head upon falling and loss of consciousness.  Patient also denies neck pain, chest pain, shortness of breath and abdominal pain. Per patient, she does not currently have any pain. She states she was in the ED 2-3 days ago and was told her has \"bruising inside\" and she intermittent experiences pain from this. 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 Upon arrival, the patient was hypoxic on room air.     REVIEW OF SYSTEMS  Pertinent positives include ground level fall. Pertinent negatives include no neck pain, chest pain, shortness of breath and abdominal pain.  All other systems reviewed and negative.    PAST MEDICAL HISTORY   has a past medical history of Arthritis; Unspecified urinary incontinence; Urinary bladder disorder; Snoring; Stroke (CMS-[facility]) (2006); Cancer (CMS-HCC) (2013); Cold; and CATARACT.    SURGICAL HISTORY   has past surgical history that includes carotid endarterectomy; cataract extraction with iol; and carotid endarterectomy (12/13/2013).    SOCIAL HISTORY  Social History   Substance Use Topics   • Smoking status: Never Smoker    • Smokeless tobacco: Never Used   • Alcohol Use: Yes      Comment: 3 per week      History   Drug Use No     FAMILY " "HISTORY  None noted during this encounter.     CURRENT MEDICATIONS  Home Medications     **Home medications have not yet been reviewed for this encounter**        ALLERGIES  No Known Allergies    PHYSICAL EXAM  VITAL SIGNS: /72 mmHg  Pulse 60  Temp(Src) 36.4 °C (97.6 °F)  Resp 16  Ht 1.499 m (4' 11\")  Wt 63.504 kg (140 lb)  BMI 28.26 kg/m2  SpO2 97%    Constitutional: Well developed, Well nourished, mild distress, Non-toxic appearance.   HENT: Normocephalic, Atraumatic, Bilateral external ears normal, Oropharynx moist, No oral exudates.   Eyes: PERRLA, EOMI, Conjunctiva normal, No discharge.   Neck: No tenderness, Supple, No stridor.   Lymphatic: No lymphadenopathy noted.   Cardiovascular: Normal heart rate, Normal rhythm.   Thorax & Lungs: Clear to auscultation bilaterally, No respiratory distress, No wheezing, No crackles. Abrasion/ contusion to left posterior rib cage.  Abdomen: Soft, No tenderness, No masses, No pulsatile masses.   Skin: Warm, Dry, No erythema, No rash.   Extremities:, No edema No cyanosis.   Musculoskeletal: Atrophy of left arm and left leg. No tenderness to palpation or major deformities noted.  Intact distal pulses  Back: No midline T-spine or L-spine tenderness.   Neurologic: Awake and alert but confused. Moves all extremities spontaneously.  Psychiatric: Affect normal, Judgment normal, Mood normal.     LABS  Labs Reviewed   CBC WITH DIFFERENTIAL - Abnormal; Notable for the following:     MPV 8.8 (*)     Neutrophils-Polys 85.30 (*)     Lymphocytes 7.10 (*)     Neutrophils (Absolute) 7.59 (*)     Lymphs (Absolute) 0.63 (*)     All other components within normal limits    Narrative:     Indicate which anticoagulants the patient is on:->UNKNOWN   COMP METABOLIC PANEL - Abnormal; Notable for the following:     Glucose 183 (*)     Bun 23 (*)     Creatinine 1.42 (*)     All other components within normal limits    Narrative:     Indicate which anticoagulants the patient is " on:->UNKNOWN   ESTIMATED GFR - Abnormal; Notable for the following:     GFR If  42 (*)     GFR If Non  35 (*)     All other components within normal limits    Narrative:     Indicate which anticoagulants the patient is on:->UNKNOWN   D-DIMER - Abnormal; Notable for the following:     D-Dimer Screen 553 (*)     All other components within normal limits   PROTHROMBIN TIME    Narrative:     Indicate which anticoagulants the patient is on:->UNKNOWN   APTT    Narrative:     Indicate which anticoagulants the patient is on:->UNKNOWN   INFLUENZA BY PCR, A/B/H1N1     All labs reviewed by me.    RADIOLOGY  CT-CHEST (THORAX) W/O   Final Result      1.  7 x 4 mm LEFT lower lobe pulmonary nodule   2.  Small RIGHT and trace LEFT pleural effusions   3.  Bibasilar atelectasis   4.  Atherosclerosis   5.  Cardiomegaly      RECOMMENDATION FOR PULMONARY NODULE EVALUATION:   Low Risk Patient:  Follow up CT at 12 months; if stable, no further follow up.      High Risk Patient:  Initial follow up CT at 6-12 months, then at 18-24 months if no change.         Low Risk - Minimal or absent history of smoking and of other known risk factors.   High Risk - History of smoking or of other known risk factors.   Fleischner Society Guidelines for Pulmonary Nodules:  Radiology, 237:2005           The radiologist's interpretation of all radiological studies have been reviewed by me.    COURSE & MEDICAL DECISION MAKING  Pertinent Labs & Imaging studies reviewed. (See chart for details)    I reviewed the patient's medical records which showed the patient had CVA in 2005 and has residual left side paralysis. She is on Plavix.     10:30 PM - Patient seen and examined at bedside. Patient will be treated with IV fluids. Ordered CT chest, CBC, CMP and other labs to evaluate her symptoms.     1:16 AM Consulted with nursing, patient is hypoxic at 81% oxygen saturation.     1:22 AM Consulted with hosptialist, Dr. Ragland and  discussed patient's condition.    Decision Making:  Patient with a contusion on her left posterior upper back, CT scan was unremarkable however the patient is hypoxic, also with renal insufficiency, discussed the case with the hospitalist for admission to hospital.    DISPOSITION:  Patient will be admitted to hosptialist, Dr. Ragland in guarded condition.    FINAL IMPRESSION  1. Pain, upper back    2. Hypoxia    3. Renal insufficiency          Aaliyah DUKE (Scribe), am scribing for, and in the presence of, Samy Gale M.D..    Electronically signed by: Aaliyah Hernandez (Scribe), 1/19/2017    ISamy M.D. personally performed the services described in this documentation, as scribed by Aaliyah Hernandez in my presence, and it is both accurate and complete.    The note accurately reflects work and decisions made by me.  Samy Gale  1/20/2017  5:28 AM

## 2017-01-20 NOTE — CARE PLAN
Problem: Safety  Goal: Will remain free from injury  Outcome: PROGRESSING AS EXPECTED  Pt verbalizes need to use call light and not try to get out of bed un assisted    Problem: Bowel/Gastric:  Goal: Normal bowel function is maintained or improved  Outcome: PROGRESSING SLOWER THAN EXPECTED  Pt does not remember date of last bm, states it has been a few days

## 2017-01-20 NOTE — THERAPY
"Physical Therapy Evaluation completed.   Bed Mobility:  Supine to Sit: Total Assist X 2  Transfers: Sit to Stand: Unable to Participate  Gait: Level Of Assist: Unable to Participate   Plan of Care: Will benefit from Physical Therapy 3 times per week  Discharge Recommendations: Equipment: Will Continue to Assess for Equipment Needs. Post-acute therapy recommended before discharged home.  Pt unable to tolerate stand pivot today due to pain, may need to sit up in cardiac chair with nsg assist over weekend once pain is more controlled.   See \"Rehab Therapy-Acute\" Patient Summary Report for complete documentation.     "

## 2017-01-20 NOTE — H&P
CHIEF COMPLAINT:  Ground level fall, left flank pain.    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    She currently lives at the [facility] assisted living facility.    She is very hard of hearing, and resulting to being a very poor historian.    However, to the extent that the patient was able to share, she was   trying to get up from the wheelchair holding on to the sink to brush her   teeth when she fell down on the floor, hitting her buttocks.  She did not lose any   consciousness.  She was then brought to the ED for further evaluation.    Additionally, in the last few days, patient admitted to shortness of breath,   with slight nausea and 2 episodes of vomiting after the fall.  She denied any   fevers, chills, cough, chest pain, leg swelling, or diarrhea.    EMERGENCY DEPARTMENT COURSE:  The patient was initially evaluated in the   emergency department, was maintaining good hemodynamics, but hypoxic on room   air at 82%, including 99% on 2 liters.  She was not febrile.  Initial blood   workup did not show any leukocytosis, with left shift, but no bandemia, with a   BMP remarkable for creatinine of 1.42 up from her baseline of 0.6-0.7 with   BUN of 23.  Her liver function test was within normal limits.  CT of the chest   was obtained, which showed 7x4 mm left lower lobe pulmonary nodule, with   small right and trace left pleural effusion, bibasilar atelectasis, and   cardiomegaly with atherosclerosis.  Patient was given IV fluids for her CIERRA.    Patient was subsequently admitted to the hospitalist service for further   evaluation and management.    REVIEW OF SYSTEMS  A complete review of system was done. All other systems were  negative.    PMH/PSH/FMH: I personally reviewed all ancillary histories as noted.    PAST MEDICAL HISTORY:  Past Medical History   Diagnosis Date   • Arthritis    • Unspecified urinary incontinence    • Urinary bladder disorder    • Snoring    • Stroke (CMS-HCC) 2006     left sided paralysis   • Cancer (CMS-HCC) 2013     melanoma  right leg  surgery pending   • Cold      cold last month   • CATARACT      ebony IOL       PAST SURGICAL HISTORY:  Past Surgical History   Procedure Laterality Date   • Carotid endarterectomy     • Cataract extraction with iol       ebony eyes   • Carotid endarterectomy  12/13/2013     Performed by Boone Mauro M.D. at SURGERY [facility]       PERSONAL/SOCIAL HISTORY:  Social History   Substance Use Topics   • Smoking status: Never Smoker    • Smokeless tobacco: Never Used   • Alcohol Use: Yes      Comment: 3 per week       FAMILY MEDICAL HISTORY:  Reviewed. Non-contributory.    ALLERGIES:  Review of patient's allergies indicates no known allergies.    HOME MEDICATIONS:  Medication Sig   CRANBERRY EXTRACT PO Take  by mouth every day.   clopidogrel (PLAVIX) 75 MG TABS Take 75 mg by mouth every day.   SIMVASTATIN PO Take 40 mg by mouth every day.   Calcium Carbonate-Vitamin D (CALCIUM + D PO) Take  by mouth 2 Times a Day.           PHYSICAL EXAMINATION:  VITAL SIGNS:  Blood pressure 146/62, heart rate 59, respiratory rate 16,   oxygen saturation 99% on 2 L, temperature 36.4 degrees Celsius.  CONSTITUTIONAL: (-) diaphoresis, (-) distress  HENT: Normocephalic, atraumatic, (-) tonsillopharyngal congestion or exudate.  EYES: PERRLA, pink conjuctivae, (-) icteric sclerae  NECK: (-) cervical lymphadenopathy, (-) neck rigidity  RESPIRATORY:  Equal chest expansion, (+) diminished air entry bilateral lung   fields, otherwise clear to auscultation bilaterally.  CARDIOVASCULAR:  Distinct heart sounds, regular rate and rhythm, no murmurs,   rubs, or gallops.  No chest wall tenderness.  No  edema.  GASTROINTESTINAL: normoactive bowel sounds, soft, (-) tenderness, (-) masses, (-) guarding/rebound  MUSCULOSKELETAL: (-) joint swelling/tenderness, (-) joint deformities, (-) muscle tenderness,   (-) gross limitation of movement of 4 extremities  SKIN: (-) erythema, warmth, rashes, ulcers, open wounds  PSYCHIATRIC: mood, affect, and thought content WNL, behavior age appropriate  NEUROLOGIC:  (+) Left hemiplegia.  Sensory grossly intact.  Very hard of hearing.        PERTINENT DIAGNOSTIC RESULTS:  Reviewed, and as mentioned above. Please refer to ED course.        ASSESSMENT:  1.  Acute hypoxic respiratory failure of unknown etiology.  2.  Ground level mechanical fall.  3.  History of residual left-sided hemiplegia secondary to right-sided   hemispheric cerebrovascular accident.  4.  Acute kidney injury.  5.  Left lower lobe lung nodule.    PLAN:  -- I will admit her to the medical floors.  I anticipate that she will need   at least 2 midnights hospital stay to provide medically necessary services.  -- I am unclear on why she is hypoxic.  I will further work her up with   influenza PCR, along with D-dimer.  If her D-dimer is elevated, will need   to work her up for PE.  I will start her on incentive spirometry, and continue   her on RT protocol along with oxygen support to keep saturations above 88%.  -- I will continue her on IV fluids of normal saline at 125 mL per hour.  We   will follow her renal function closely.  Repeat BMP in the morning.  -- I will get PT and OT evaluation for her for discharge planning.  -- She will need outpatient followup for her lung nodules per [employer] recommendations.  -- Resume home dose Plavix and statin.    Deep venous thrombosis prophylaxis -- heparin subcutaneous.  Gastrointestinal prophylaxis -- not indicated.  Code status -- full code.       ____________________________________     Haseeb Ragland M.D.    [facility] / [facility]    DD:  01/20/2017 06:06:59  DT:   01/20/2017 06:30:47    D#:  [id number]  Job#:  [id number]

## 2017-01-20 NOTE — THERAPY
"Occupational Therapy Evaluation completed.   Functional Status:  Pt s/p GLF with acute respiratory failure, with hx of L sided deficits baseline. Pt currently limited by pain with any movement that impairs pt's participation with UB ADLs and self-feeding tasks. Pt would benefit from acute skilled services and might need post acute skilled services dependent on pt's PLOF and pain management  Plan of Care: Will benefit from Occupational Therapy 3 times per week  Discharge Recommendations:  Equipment: Will Continue to Assess for Equipment Needs. Post-acute therapy recommended before discharged home.    See \"Rehab Therapy-Acute\" Patient Summary Report for complete documentation.    "

## 2017-01-20 NOTE — PROGRESS NOTES
Med rec complete per pt's MAR from [facility]  MAR copied and returned to pt's chart  Allergies reviewed - NKDA  No recent ABX noted on MAR

## 2017-01-20 NOTE — ED NOTES
Pt bib EMS from [facility]:  Chief Complaint   Patient presents with   • GLF     Pt non-ambulatory, fell out of chair while brushing teeth this evening.   • Back Pain     left flank pain.     Pt A&O, denies LOC, denies CP, no SOB, left sided paralysis, hx of stroke 2005.      Pt changed into gown.  Chart up for ERP.

## 2017-01-21 ENCOUNTER — APPOINTMENT (OUTPATIENT)
Dept: RADIOLOGY | Facility: MEDICAL CENTER | Age: 82
DRG: 551 | End: 2017-01-21
Attending: INTERNAL MEDICINE
Payer: MEDICARE

## 2017-01-21 LAB
ANION GAP SERPL CALC-SCNC: 7 MMOL/L (ref 0–11.9)
BASOPHILS # BLD AUTO: 0.3 % (ref 0–1.8)
BASOPHILS # BLD: 0.02 K/UL (ref 0–0.12)
BUN SERPL-MCNC: 20 MG/DL (ref 8–22)
CALCIUM SERPL-MCNC: 7.7 MG/DL (ref 8.5–10.5)
CHLORIDE SERPL-SCNC: 110 MMOL/L (ref 96–112)
CO2 SERPL-SCNC: 21 MMOL/L (ref 20–33)
CREAT SERPL-MCNC: 0.86 MG/DL (ref 0.5–1.4)
EOSINOPHIL # BLD AUTO: 0.1 K/UL (ref 0–0.51)
EOSINOPHIL NFR BLD: 1.5 % (ref 0–6.9)
ERYTHROCYTE [DISTWIDTH] IN BLOOD BY AUTOMATED COUNT: 46.8 FL (ref 35.9–50)
GFR SERPL CREATININE-BSD FRML MDRD: >60 ML/MIN/1.73 M 2
GLUCOSE SERPL-MCNC: 113 MG/DL (ref 65–99)
HCT VFR BLD AUTO: 34 % (ref 37–47)
HGB BLD-MCNC: 11.2 G/DL (ref 12–16)
IMM GRANULOCYTES # BLD AUTO: 0.02 K/UL (ref 0–0.11)
IMM GRANULOCYTES NFR BLD AUTO: 0.3 % (ref 0–0.9)
LYMPHOCYTES # BLD AUTO: 1.08 K/UL (ref 1–4.8)
LYMPHOCYTES NFR BLD: 16.4 % (ref 22–41)
MCH RBC QN AUTO: 31.3 PG (ref 27–33)
MCHC RBC AUTO-ENTMCNC: 32.6 G/DL (ref 33.6–35)
MCV RBC AUTO: 95.8 FL (ref 81.4–97.8)
MONOCYTES # BLD AUTO: 0.71 K/UL (ref 0–0.85)
MONOCYTES NFR BLD AUTO: 10.8 % (ref 0–13.4)
NEUTROPHILS # BLD AUTO: 4.64 K/UL (ref 2–7.15)
NEUTROPHILS NFR BLD: 70.7 % (ref 44–72)
NRBC # BLD AUTO: 0 K/UL
NRBC BLD AUTO-RTO: 0 /100 WBC
PLATELET # BLD AUTO: 170 K/UL (ref 164–446)
PMV BLD AUTO: 8.9 FL (ref 9–12.9)
POTASSIUM SERPL-SCNC: 3.7 MMOL/L (ref 3.6–5.5)
RBC # BLD AUTO: 3.55 M/UL (ref 4.2–5.4)
SODIUM SERPL-SCNC: 138 MMOL/L (ref 135–145)
WBC # BLD AUTO: 6.6 K/UL (ref 4.8–10.8)

## 2017-01-21 PROCEDURE — 99232 SBSQ HOSP IP/OBS MODERATE 35: CPT | Performed by: INTERNAL MEDICINE

## 2017-01-21 PROCEDURE — 770006 HCHG ROOM/CARE - MED/SURG/GYN SEMI*

## 2017-01-21 PROCEDURE — 85025 COMPLETE CBC W/AUTO DIFF WBC: CPT

## 2017-01-21 PROCEDURE — 700105 HCHG RX REV CODE 258: Performed by: INTERNAL MEDICINE

## 2017-01-21 PROCEDURE — 700102 HCHG RX REV CODE 250 W/ 637 OVERRIDE(OP): Performed by: INTERNAL MEDICINE

## 2017-01-21 PROCEDURE — 72148 MRI LUMBAR SPINE W/O DYE: CPT

## 2017-01-21 PROCEDURE — A9270 NON-COVERED ITEM OR SERVICE: HCPCS | Performed by: INTERNAL MEDICINE

## 2017-01-21 PROCEDURE — 700112 HCHG RX REV CODE 229: Performed by: INTERNAL MEDICINE

## 2017-01-21 PROCEDURE — 80048 BASIC METABOLIC PNL TOTAL CA: CPT

## 2017-01-21 PROCEDURE — 700111 HCHG RX REV CODE 636 W/ 250 OVERRIDE (IP): Performed by: INTERNAL MEDICINE

## 2017-01-21 PROCEDURE — 36415 COLL VENOUS BLD VENIPUNCTURE: CPT

## 2017-01-21 RX ADMIN — STANDARDIZED SENNA CONCENTRATE AND DOCUSATE SODIUM 1 TABLET: 8.6; 5 TABLET, FILM COATED ORAL at 20:17

## 2017-01-21 RX ADMIN — HEPARIN SODIUM 5000 UNITS: 5000 INJECTION INTRAVENOUS; SUBCUTANEOUS at 20:17

## 2017-01-21 RX ADMIN — GABAPENTIN 100 MG: 100 CAPSULE ORAL at 09:24

## 2017-01-21 RX ADMIN — HEPARIN SODIUM 5000 UNITS: 5000 INJECTION INTRAVENOUS; SUBCUTANEOUS at 06:12

## 2017-01-21 RX ADMIN — SODIUM CHLORIDE: 9 INJECTION, SOLUTION INTRAVENOUS at 23:00

## 2017-01-21 RX ADMIN — SIMVASTATIN 40 MG: 20 TABLET, FILM COATED ORAL at 20:16

## 2017-01-21 RX ADMIN — GABAPENTIN 100 MG: 100 CAPSULE ORAL at 20:16

## 2017-01-21 RX ADMIN — CLOPIDOGREL 75 MG: 75 TABLET, FILM COATED ORAL at 09:24

## 2017-01-21 RX ADMIN — GABAPENTIN 100 MG: 100 CAPSULE ORAL at 14:58

## 2017-01-21 RX ADMIN — DOCUSATE SODIUM 100 MG: 100 CAPSULE ORAL at 09:24

## 2017-01-21 RX ADMIN — HEPARIN SODIUM 5000 UNITS: 5000 INJECTION INTRAVENOUS; SUBCUTANEOUS at 14:59

## 2017-01-21 ASSESSMENT — ENCOUNTER SYMPTOMS
ABDOMINAL PAIN: 0
DIAPHORESIS: 0
DIZZINESS: 0
COUGH: 0
NAUSEA: 0
DIARRHEA: 0
FEVER: 0
SEIZURES: 0
SHORTNESS OF BREATH: 1
MYALGIAS: 1
HEADACHES: 0
BACK PAIN: 1

## 2017-01-21 ASSESSMENT — PATIENT HEALTH QUESTIONNAIRE - PHQ9
1. LITTLE INTEREST OR PLEASURE IN DOING THINGS: NOT AT ALL
SUM OF ALL RESPONSES TO PHQ9 QUESTIONS 1 AND 2: 0
2. FEELING DOWN, DEPRESSED, IRRITABLE, OR HOPELESS: NOT AT ALL
SUM OF ALL RESPONSES TO PHQ QUESTIONS 1-9: 0

## 2017-01-21 ASSESSMENT — PAIN SCALES - GENERAL
PAINLEVEL_OUTOF10: 3
PAINLEVEL_OUTOF10: 2

## 2017-01-21 NOTE — PROGRESS NOTES
Pt is A&O x 4. Alutiiq. HX stroke with flaccid left side. Pt has been living at [facility] and fell from wheel chair while brushing teeth last night. Pt had a drop in bp, Dr Barajas was notified and 500 ml ns bolus given, recheck was much improved: see flow sheet. VQ lung scan and  Venous doplar completed today. Pt also has thoracic and lumbar x rays ordered. No fractures have been discovered yet at this time but pt is very sore. Pt has regular diet orders with chopped meats. Pt is able to feed self. Incont of bowel and bladder. Iv right hand with NS @ 125. Pt recommends that we get pt out of bed with cardiac chair tomorrow.

## 2017-01-21 NOTE — PROGRESS NOTES
Alert and oriented x 4. Rested well.  Turned q 2 hours, changed as needed.  Offered water periodically when awake.  IV site patent.  Pain with turning but denies need for pain medication at this time.  Bed low, bed alarm on and call light within reach.

## 2017-01-21 NOTE — PROGRESS NOTES
Hospital Medicine Progress Note, Adult, Complex               Author: Dea Barajas Date & Time created: 1/20/2017  4:37 PM     Interval History:  The patient is admitted after a ground level fall with back pain radiating around to her abdomen over the right side and worse with exertion    She had hypoxia on presentation and CT of the chest shows no embolus but small bilateral effusions      Review of Systems:  Review of Systems   Constitutional: Negative for fever, chills and diaphoresis.   Respiratory: Positive for shortness of breath. Negative for cough.         Increased back pain with deep inspiration   Cardiovascular: Negative for chest pain, palpitations and leg swelling.   Gastrointestinal: Negative for nausea and abdominal pain.   Genitourinary: Negative for dysuria and urgency.   Musculoskeletal: Positive for back pain.        Back pain in mid back radiating around to the front and worse with movement or deep inspiration   Skin: Negative for itching.   Neurological: Negative for dizziness and headaches.       Physical Exam:  Physical Exam   Constitutional: No distress.   HENT:   Mouth/Throat: Oropharynx is clear and moist.   Eyes: Conjunctivae are normal.   Cardiovascular: Normal rate and regular rhythm.  PMI is displaced.    Pulmonary/Chest: No respiratory distress. She has no wheezes. She has no rales.   Abdominal: Bowel sounds are normal. She exhibits no distension. There is no tenderness. There is no rebound and no guarding.   Neurological: She is alert. Coordination abnormal.   Left side weak   Skin: No rash noted.   Nursing note and vitals reviewed.      Labs:        Invalid input(s): [id number]      Recent Labs      01/19/17   2245   SODIUM  136   POTASSIUM  4.7   CHLORIDE  101   CO2  25   BUN  23*   CREATININE  1.42*   CALCIUM  9.1     Recent Labs      01/19/17   2245   ALTSGPT  17   ASTSGOT  35   ALKPHOSPHAT  81   TBILIRUBIN  0.5   GLUCOSE  183*     Recent Labs      01/19/17   2245   RBC   4.54   HEMOGLOBIN  14.6   HEMATOCRIT  42.4   PLATELETCT  229   PROTHROMBTM  13.4   APTT  27.5   INR  0.99     Recent Labs      17   2245   WBC  8.9   NEUTSPOLYS  85.30*   LYMPHOCYTES  7.10*   MONOCYTES  6.80   EOSINOPHILS  0.30   BASOPHILS  0.30   ASTSGOT  35   ALTSGPT  17   ALKPHOSPHAT  81   TBILIRUBIN  0.5           Hemodynamics:  Temp (24hrs), Av.3 °C (97.3 °F), Min:36.1 °C (96.9 °F), Max:36.4 °C (97.6 °F)  Temperature: 36.1 °C (96.9 °F)  Pulse  Av.3  Min: 48  Max: 72   Blood Pressure : (!) 84/44 mmHg (note to nurse), NIBP: (!) 169/70 mmHg     Respiratory:    Respiration: 18, Pulse Oximetry: 96 %, O2 Daily Delivery Respiratory : Silicone Nasal Cannula     Work Of Breathing / Effort: Mild  RUL Breath Sounds: Clear;Diminished, RML Breath Sounds: Diminished, RLL Breath Sounds: Diminished, ISAÍAS Breath Sounds: Clear;Diminished, LLL Breath Sounds: Diminished  Fluids:  No intake or output data in the 24 hours ending 17 1637  Weight: 64.2 kg (141 lb 8.6 oz)  GI/Nutrition:  Orders Placed This Encounter   Procedures   • Diet Order     Standing Status: Standing      Number of Occurrences: 1      Standing Expiration Date:      Order Specific Question:  Diet:     Answer:  Regular [1]     Medical Decision Making, by Problem:  Active Hospital Problems    Diagnosis   • Fall [W19.XXXA] will check xray of spine to rule out fracture  Continue physical therapy  Gabapentin for pain and tylenol   • Acute respiratory failure with hypoxia (CMS-HCC) [J96.01]  Due to atelectasis   Taper oxygen as tolerated  Continue physical therapy    Recent stroke with left side weakness  Continue plavix and zocor       Labs reviewed, Radiology images reviewed and Medications reviewed  Mims catheter: No Mims      DVT Prophylaxis: Heparin    Ulcer prophylaxis: Not indicated    Assessed for rehab: Patient was assess for and/or received rehabilitation services during this hospitalization

## 2017-01-21 NOTE — RESPIRATORY CARE
COPD EDUCATION by COPD CLINICAL EDUCATOR  1/21/2017 at 6:04 AM by Cortney Monae     Patient reviewed by COPD education team. Patient does not qualify for COPD program.

## 2017-01-21 NOTE — CARE PLAN
Problem: Safety  Goal: Will remain free from injury  Outcome: PROGRESSING AS EXPECTED  Pt uses call light appropriately    Problem: Fluid Volume:  Goal: Will maintain balanced intake and output  Outcome: PROGRESSING AS EXPECTED  adequate po intake

## 2017-01-21 NOTE — PROGRESS NOTES
Hospital Medicine Progress Note, Adult, Complex               Author: Dea Barajas Date & Time created: 1/21/2017  8:57 AM     Interval History:  The patient is admitted after a ground level fall with back pain radiating around to her abdomen over the right side and worse with exertion    She had hypoxia on presentation and she has small pleural effusions  Perfusion scan is low probability for embolus  Xray shows compression of L1      Review of Systems:  Review of Systems   Constitutional: Negative for fever and diaphoresis.   Respiratory: Positive for shortness of breath. Negative for cough.         Increased back pain with deep inspiration   Cardiovascular: Negative for chest pain and leg swelling.   Gastrointestinal: Negative for nausea, abdominal pain and diarrhea.   Genitourinary: Negative for dysuria and urgency.   Musculoskeletal: Positive for myalgias and back pain.        Back pain in mid to low back radiating around to the front and worse with movement and deep inspiration   Neurological: Negative for dizziness, seizures and headaches.       Physical Exam:  Physical Exam   Constitutional: No distress.   HENT:   Mouth/Throat: Oropharynx is clear and moist.   Eyes: No scleral icterus.   Cardiovascular: Normal rate and regular rhythm.  PMI is displaced.    Pulmonary/Chest: No respiratory distress. She has no wheezes. She has no rales.   Abdominal: Soft. Bowel sounds are normal. She exhibits no distension. There is no tenderness. There is no rebound and no guarding.   Neurological: She is alert. Coordination abnormal.   Left side weak   Skin: No rash noted. She is not diaphoretic. No erythema.   Nursing note and vitals reviewed.      Labs:        Invalid input(s): [id number]      Recent Labs      01/19/17 2245 01/21/17   0305   SODIUM  136  138   POTASSIUM  4.7  3.7   CHLORIDE  101  110   CO2  25  21   BUN  23*  20   CREATININE  1.42*  0.86   CALCIUM  9.1  7.7*     Recent Labs      01/19/17 2245   17   0305   ALTSGPT  17   --    ASTSGOT  35   --    ALKPHOSPHAT  81   --    TBILIRUBIN  0.5   --    GLUCOSE  183*  113*     Recent Labs      17   0305   RBC  4.54  3.55*   HEMOGLOBIN  14.6  11.2*   HEMATOCRIT  42.4  34.0*   PLATELETCT  229  170   PROTHROMBTM  13.4   --    APTT  27.5   --    INR  0.99   --      Recent Labs      17   0305   WBC  8.9  6.6   NEUTSPOLYS  85.30*  70.70   LYMPHOCYTES  7.10*  16.40*   MONOCYTES  6.80  10.80   EOSINOPHILS  0.30  1.50   BASOPHILS  0.30  0.30   ASTSGOT  35   --    ALTSGPT  17   --    ALKPHOSPHAT  81   --    TBILIRUBIN  0.5   --            Hemodynamics:  Temp (24hrs), Av.2 °C (97.2 °F), Min:35.8 °C (96.4 °F), Max:36.7 °C (98 °F)  Temperature: 36.3 °C (97.4 °F)  Pulse  Av.5  Min: 48  Max: 72   Blood Pressure : 131/59 mmHg     Respiratory:    Respiration: 16, Pulse Oximetry: 97 %        RUL Breath Sounds: Diminished, RML Breath Sounds: Diminished, RLL Breath Sounds: Diminished, ISAÍAS Breath Sounds: Diminished, LLL Breath Sounds: Diminished  Fluids:    Intake/Output Summary (Last 24 hours) at 17 0857  Last data filed at 17 0400   Gross per 24 hour   Intake   1600 ml   Output      0 ml   Net   1600 ml     Weight: 64.2 kg (141 lb 8.6 oz)  GI/Nutrition:  Orders Placed This Encounter   Procedures   • Diet Order     Standing Status: Standing      Number of Occurrences: 1      Standing Expiration Date:      Order Specific Question:  Diet:     Answer:  Regular [1]     Medical Decision Making, by Problem:  Active Hospital Problems    Diagnosis   • Fall [W19.XXXA]   Fracture of L1 on xray, will check MRI for acuity  May need kyphoplasty  Continue physical therapy  Gabapentin for pain and tylenol   • Acute respiratory failure with hypoxia (CMS-HCC) [J96.01]  Taper oxygen   Continue physical therapy    Recent stroke with left side weakness  Continue plavix and zocor       Labs reviewed, Radiology images reviewed and  Medications reviewed  Mims catheter: No Mims      DVT Prophylaxis: Heparin    Ulcer prophylaxis: Not indicated    Assessed for rehab: Patient was assess for and/or received rehabilitation services during this hospitalization

## 2017-01-22 PROBLEM — M48.061 SPINAL STENOSIS OF LUMBAR REGION: Status: ACTIVE | Noted: 2017-01-22

## 2017-01-22 PROCEDURE — A9270 NON-COVERED ITEM OR SERVICE: HCPCS | Performed by: INTERNAL MEDICINE

## 2017-01-22 PROCEDURE — 700102 HCHG RX REV CODE 250 W/ 637 OVERRIDE(OP): Performed by: INTERNAL MEDICINE

## 2017-01-22 PROCEDURE — 700105 HCHG RX REV CODE 258: Performed by: INTERNAL MEDICINE

## 2017-01-22 PROCEDURE — 770006 HCHG ROOM/CARE - MED/SURG/GYN SEMI*

## 2017-01-22 PROCEDURE — 99233 SBSQ HOSP IP/OBS HIGH 50: CPT | Performed by: INTERNAL MEDICINE

## 2017-01-22 PROCEDURE — 700112 HCHG RX REV CODE 229: Performed by: INTERNAL MEDICINE

## 2017-01-22 PROCEDURE — 700111 HCHG RX REV CODE 636 W/ 250 OVERRIDE (IP): Performed by: INTERNAL MEDICINE

## 2017-01-22 RX ADMIN — CLOPIDOGREL 75 MG: 75 TABLET, FILM COATED ORAL at 08:58

## 2017-01-22 RX ADMIN — SODIUM CHLORIDE: 9 INJECTION, SOLUTION INTRAVENOUS at 06:43

## 2017-01-22 RX ADMIN — ACETAMINOPHEN 650 MG: 325 TABLET, FILM COATED ORAL at 03:25

## 2017-01-22 RX ADMIN — GABAPENTIN 100 MG: 100 CAPSULE ORAL at 21:14

## 2017-01-22 RX ADMIN — HEPARIN SODIUM 5000 UNITS: 5000 INJECTION INTRAVENOUS; SUBCUTANEOUS at 05:12

## 2017-01-22 RX ADMIN — GABAPENTIN 100 MG: 100 CAPSULE ORAL at 14:53

## 2017-01-22 RX ADMIN — DOCUSATE SODIUM 100 MG: 100 CAPSULE ORAL at 08:58

## 2017-01-22 RX ADMIN — STANDARDIZED SENNA CONCENTRATE AND DOCUSATE SODIUM 1 TABLET: 8.6; 5 TABLET, FILM COATED ORAL at 21:15

## 2017-01-22 RX ADMIN — HEPARIN SODIUM 5000 UNITS: 5000 INJECTION INTRAVENOUS; SUBCUTANEOUS at 14:53

## 2017-01-22 RX ADMIN — GABAPENTIN 100 MG: 100 CAPSULE ORAL at 08:58

## 2017-01-22 RX ADMIN — LACTULOSE 30 ML: 10 SOLUTION ORAL at 21:14

## 2017-01-22 RX ADMIN — HEPARIN SODIUM 5000 UNITS: 5000 INJECTION INTRAVENOUS; SUBCUTANEOUS at 21:14

## 2017-01-22 RX ADMIN — SIMVASTATIN 40 MG: 20 TABLET, FILM COATED ORAL at 21:14

## 2017-01-22 ASSESSMENT — ENCOUNTER SYMPTOMS
SEIZURES: 0
BACK PAIN: 1
NAUSEA: 0
DIAPHORESIS: 0
COUGH: 0
ABDOMINAL PAIN: 0
DIZZINESS: 0
WHEEZING: 0
FEVER: 0
SHORTNESS OF BREATH: 1
HEADACHES: 0
MYALGIAS: 1

## 2017-01-22 ASSESSMENT — PAIN SCALES - GENERAL
PAINLEVEL_OUTOF10: 4
PAINLEVEL_OUTOF10: 2

## 2017-01-22 NOTE — CARE PLAN
Problem: Safety  Goal: Will remain free from injury  Outcome: PROGRESSING AS EXPECTED  Bed alarm and call light in reach.    Problem: Pain Management  Goal: Pain level will decrease to patient’s comfort goal  Outcome: PROGRESSING AS EXPECTED  Patient educated to ask for prn pain meds for pain management.

## 2017-01-22 NOTE — PROGRESS NOTES
Patient is alert and oriented to person, place, time, and situation.  A two person assist to the bedside commode.   Peripheral IV with normal saline at 125 ml per hour.  Administered prn po tylenol for complaints of back pain.    Bed alarm in place.  Brace to be applied to left lower extremity for ambulation/pivots.

## 2017-01-22 NOTE — DISCHARGE PLANNING
Medical SW    Referral: Sw noted order for SNF choice.     Intervention: Sw attempted to wake pt at bedside to discuss order. Sw was unable to wake pt who was sleeping soundly.    Sw left VM report on unit [facility], phone and left choice form on La's keyboard.    Sw received call from hospitalist RN. Pt to d/c tomorrow. Sw met w/ pt at bedside again. Pt states she cannot decide which SNF to pick. She lives at [facility] in [place] and would not mind being in a SNF in [place]. Pt gave verbal permission to call her dtr, Tony, to acquire SNF choice.     Sw called Tony ([phone]) and left VM requesting return phone call for SNF choice. Sw provided Sunday and Monday Sw contact names and numbers.      Plan: Sw to assist w/ d/c planning as needed.

## 2017-01-22 NOTE — CONSULTS
DATE OF SERVICE:  01/22/2017    CONSULTING PHYSICIAN:  Markus Wong MD    REQUESTING PHYSICIAN:  Dea Barajas MD    HOSPITALIST SERVICE:  Neurosurgery.    REASON FOR CONSULTATION:  Lumbar stenosis.    HISTORY OF PRESENT ILLNESS:  Patient is a 89-year-old with a past medical   history of right hemispheric stroke and left hemiplegia who is wheelchair   bound in nursing home.  Due to her carotid stenosis found at the time of her   stroke, she is status post carotid endarterectomy and on Plavix.  By report,   she fell while in the bathroom on 01/21/2017.  Since that time, she is being   complaining of right-sided back pain whenever she moves.  She states that when   she is lying still or sitting still, she does not have any pain.  She denies   any numbness or tingling or weakness in her lower extremities other than her   baseline left hemiplegia.  She does not have any changes in her bowel or   bladder from what she can tell me.  Positive for right posterior flank pain,   nausea, vomiting, shortness of breath.  Otherwise, all systems were negative.    PAST MEDICAL HISTORY:  1.  Right hemispheric stroke.  2.  Left hemiplegia.  3.  Melanoma.  4.  Cataract.  5.  Urinary incontinence.  6.  Arthritis.    PAST SURGICAL HISTORY:  1.  Carotid endarterectomy.  2.  Cataract extraction.  3.  Melanoma removal, right leg.    PAST SOCIAL HISTORY:  Denies alcohol, tobacco or drug use.    FAMILY MEDICAL HISTORY:  Noncontributory, but positive for cancer.    ALLERGIES:  No known drug allergies.    HOME MEDICATIONS:  1.  Plavix.  2.  Simvastatin.  3.  Calcium.  4.  Cranberry extract.    PHYSICAL EXAMINATION:  VITAL SIGNS:  Afebrile.  Vital signs stable.  HEENT:  Normocephalic, atraumatic.  Pupils are equal and reactive to light.    Extraocular movements intact.  Mucous membranes are moist.  NECK:  Supple, soft, nontender.  No JVD.  CARDIOVASCULAR:  Regular rate and rhythm.  RESPIRATORY:  Bilateral breath sounds are equal.  Good  respiratory effort.  No   deformity on the chest.  ABDOMEN:  Soft and nontender.  BACK:  There is some mild right CVA area of tenderness to palpation.  There   are no step-offs, in the back there, no tenderness to palpation over the   thoracic or lumbar spine.  EXTREMITIES:  2+ peripheral pulses.  Left upper and lower extremities noted to   have contractures.  No clubbing, cyanosis or edema.  NEUROLOGIC:  Patient is alert and oriented to person and place.  Left upper   and lower motor strength is 0/5 with contractures noted.  There is mottling of   the left upper extremity as well.  Hyperreflexic in the left upper and lower   extremity as well.  Strength is 5/5 on the right upper and lower extremity   with no focal motor or sensory deficits noted.  PSYCHIATRIC:  Appropriate mood, affect and judgment.    LABORATORY DATA:  Full review of labs have been reviewed in EPIC and they are   noncontributory to the current evaluation.    IMAGING:  On 01/21/2017, AP and lateral x-rays of the thoracic and spine, no   acute deformities noted, mild thoracolumbar scoliosis noted.    Lumbar spine x-ray on 01/21/2017 showed mild thoracolumbar scoliosis with   numerous wedge deformities noted in the upper lumbar spine.    MRI of lumbar spine without contrast:  There is no acute fractures noted;   however, chronic compression deformities of L1, L2, L3 are noted.  There is a   grade I degenerative spondylolisthesis at L4-L5 with moderate-to-severe   bilateral neuroforaminal stenosis and moderate central stenosis.  There are no   acute findings.    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  1.  Muscle spasm.  2.  Lumbar stenosis.  3.  Lumbar spondylolisthesis.  4.  Lumbar spondylosis.    PLAN:  1.  From my exam and discussion with her, it appears that her pain after   falling is likely musculoskeletal.  She  does not have any point tenderness on   her back.  She has no new neurologic findings.  I believe that her lumbar   stenosis while significant, is essentially asymptomatic.  2.  She does not require any bracing or any activity restrictions.  3.  As she is having either no or very minimal symptoms from her lumbar   stenosis, I do not believe that she needs further workup or evaluation of   this.  She does not need followup with me or with another spine surgeon.    Thank you very much for this consultation.  Please do not hesitate to call   with any questions or concerns.       ____________________________________     Markus Wong MD [facility] / [facility]    DD:  01/22/2017 09:38:13  DT:  01/22/2017 10:13:38    D#:  [id number]  Job#:  [id number]

## 2017-01-22 NOTE — PROGRESS NOTES
AOOX4. [place]. MRi back today, possible kyphoplasty candidate. Hemiplegia Left with contractures to hand . Regular diet, feeds self with set up. Incont of bowel and bladder.

## 2017-01-23 PROCEDURE — 700111 HCHG RX REV CODE 636 W/ 250 OVERRIDE (IP): Performed by: INTERNAL MEDICINE

## 2017-01-23 PROCEDURE — A9270 NON-COVERED ITEM OR SERVICE: HCPCS | Performed by: INTERNAL MEDICINE

## 2017-01-23 PROCEDURE — 700102 HCHG RX REV CODE 250 W/ 637 OVERRIDE(OP): Performed by: INTERNAL MEDICINE

## 2017-01-23 PROCEDURE — 97535 SELF CARE MNGMENT TRAINING: CPT

## 2017-01-23 PROCEDURE — 700112 HCHG RX REV CODE 229: Performed by: INTERNAL MEDICINE

## 2017-01-23 PROCEDURE — 99232 SBSQ HOSP IP/OBS MODERATE 35: CPT | Performed by: INTERNAL MEDICINE

## 2017-01-23 PROCEDURE — 770006 HCHG ROOM/CARE - MED/SURG/GYN SEMI*

## 2017-01-23 RX ADMIN — GABAPENTIN 100 MG: 100 CAPSULE ORAL at 08:06

## 2017-01-23 RX ADMIN — HEPARIN SODIUM 5000 UNITS: 5000 INJECTION INTRAVENOUS; SUBCUTANEOUS at 15:56

## 2017-01-23 RX ADMIN — GABAPENTIN 100 MG: 100 CAPSULE ORAL at 15:56

## 2017-01-23 RX ADMIN — DOCUSATE SODIUM 100 MG: 100 CAPSULE ORAL at 08:06

## 2017-01-23 RX ADMIN — HEPARIN SODIUM 5000 UNITS: 5000 INJECTION INTRAVENOUS; SUBCUTANEOUS at 21:42

## 2017-01-23 RX ADMIN — CLOPIDOGREL 75 MG: 75 TABLET, FILM COATED ORAL at 08:06

## 2017-01-23 RX ADMIN — HEPARIN SODIUM 5000 UNITS: 5000 INJECTION INTRAVENOUS; SUBCUTANEOUS at 05:45

## 2017-01-23 RX ADMIN — STANDARDIZED SENNA CONCENTRATE AND DOCUSATE SODIUM 1 TABLET: 8.6; 5 TABLET, FILM COATED ORAL at 21:42

## 2017-01-23 RX ADMIN — GABAPENTIN 100 MG: 100 CAPSULE ORAL at 21:42

## 2017-01-23 RX ADMIN — SIMVASTATIN 40 MG: 20 TABLET, FILM COATED ORAL at 21:42

## 2017-01-23 ASSESSMENT — PAIN SCALES - GENERAL
PAINLEVEL_OUTOF10: 0
PAINLEVEL_OUTOF10: 0

## 2017-01-23 ASSESSMENT — ENCOUNTER SYMPTOMS
FEVER: 0
SHORTNESS OF BREATH: 1
DIZZINESS: 0
CHILLS: 0
NAUSEA: 0
PALPITATIONS: 0
SEIZURES: 0
ABDOMINAL PAIN: 0
WHEEZING: 0
MYALGIAS: 1

## 2017-01-23 NOTE — PROGRESS NOTES
Hospital Medicine Progress Note, Adult, Complex               Author: Dea Barajas Date & Time created: 1/23/2017  3:09 PM     Interval History:  The patient is admitted after a ground level fall with back pain radiating around to her abdomen over the right side and worse with exertion    She had hypoxia on presentation and she has small pleural effusions but was not breathing deeply due to pain with deep inspiration   she is wheelchair bound at her baseline  The patient is sitting upright for longer periods of time today as her pain continues to improve      Review of Systems:  Review of Systems   Constitutional: Negative for fever and chills.   Respiratory: Positive for shortness of breath. Negative for wheezing.    Cardiovascular: Negative for chest pain, palpitations and leg swelling.   Gastrointestinal: Negative for nausea and abdominal pain.   Genitourinary: Negative for dysuria.   Musculoskeletal: Positive for myalgias.        Right side back pain is improving   Skin: Negative for rash.   Neurological: Negative for dizziness and seizures.       Physical Exam:  Physical Exam   Constitutional: No distress.   HENT:   Mouth/Throat: No oropharyngeal exudate.   Eyes: Conjunctivae are normal.   Cardiovascular: Normal rate and regular rhythm.  PMI is displaced.    Pulmonary/Chest: No respiratory distress. She has no wheezes. She has no rales.   Abdominal: Soft. Bowel sounds are normal. There is no tenderness.   Musculoskeletal: She exhibits no edema.   Neurological: She is alert. Coordination abnormal.   Left side weak with wrist contractures   Skin: Skin is warm and dry. No rash noted. She is not diaphoretic. No erythema.   Nursing note and vitals reviewed.      Labs:        Invalid input(s): [id number]      Recent Labs      01/21/17   0305   SODIUM  138   POTASSIUM  3.7   CHLORIDE  110   CO2  21   BUN  20   CREATININE  0.86   CALCIUM  7.7*     Recent Labs      01/21/17   0305   GLUCOSE  113*     Recent Labs       17   0305   RBC  3.55*   HEMOGLOBIN  11.2*   HEMATOCRIT  34.0*   PLATELETCT  170     Recent Labs      17   0305   WBC  6.6   NEUTSPOLYS  70.70   LYMPHOCYTES  16.40*   MONOCYTES  10.80   EOSINOPHILS  1.50   BASOPHILS  0.30           Hemodynamics:  Temp (24hrs), Av.9 °C (98.4 °F), Min:36.7 °C (98 °F), Max:37.1 °C (98.7 °F)  Temperature: 37 °C (98.6 °F)  Pulse  Av.2  Min: 48  Max: 83   Blood Pressure : 107/44 mmHg     Respiratory:    Respiration: 19, Pulse Oximetry: 96 %     Work Of Breathing / Effort: Mild  RUL Breath Sounds: Diminished;Clear, RML Breath Sounds: Diminished;Clear, RLL Breath Sounds: Diminished;Clear, ISAÍAS Breath Sounds: Diminished;Clear, LLL Breath Sounds: Diminished;Clear  Fluids:  No intake or output data in the 24 hours ending 17 1509     GI/Nutrition:  Orders Placed This Encounter   Procedures   • Diet Order     Standing Status: Standing      Number of Occurrences: 1      Standing Expiration Date:      Order Specific Question:  Diet:     Answer:  Regular [1]     Medical Decision Making, by Problem:  Active Hospital Problems    Diagnosis   • Fall [W19.XXXA]   MRI with spinal stenosis    Severe central canal stenosis on lumbar spine MRI  I did consult neurosurgery Dr. Wong who does not recommend surgical intervention  Physical therapy to continue   • Acute respiratory failure with hypoxia (CMS-HCC) [J96.01]  Due to atelectasis  Encourage mobility    Recent stroke with left side weakness  Continue plavix and zocor   Ok to discharge to her skilled nursing facility    Labs reviewed, Radiology images reviewed and Medications reviewed  Mims catheter: No Mims      DVT Prophylaxis: Heparin    Ulcer prophylaxis: Not indicated    Assessed for rehab: Patient was assess for and/or received rehabilitation services during this hospitalization

## 2017-01-23 NOTE — DISCHARGE PLANNING
Received call from pts dtr Tony [phone], she made choices for SNF. [facility], [facility], [facility].     Faxed to [facility].    Received a message from Tony requesting [facility] be first choice, requested call back.

## 2017-01-23 NOTE — PROGRESS NOTES
Hospital Medicine Progress Note, Adult, Complex               Author: Dea Barajas Date & Time created: 1/22/2017  4:23 PM     Interval History:  The patient is admitted after a ground level fall with back pain radiating around to her abdomen over the right side and worse with exertion    She had hypoxia on presentation and she has small pleural effusions but was not breathing deeply due to pain with deep inspiration  Today she did sit upright with little pain, she is normally wheelchair bound  She states her pain is improving today      Review of Systems:  Review of Systems   Constitutional: Negative for fever and diaphoresis.   Respiratory: Positive for shortness of breath. Negative for cough and wheezing.    Cardiovascular: Negative for chest pain and leg swelling.   Gastrointestinal: Negative for nausea and abdominal pain.   Genitourinary: Negative for dysuria.   Musculoskeletal: Positive for myalgias and back pain.        Back pain in mid to low back radiating to the right side   Neurological: Negative for dizziness, seizures and headaches.       Physical Exam:  Physical Exam   Constitutional: No distress.   HENT:   Mouth/Throat: No oropharyngeal exudate.   Eyes: Conjunctivae are normal.   Cardiovascular: Normal rate and regular rhythm.  PMI is displaced.    Pulmonary/Chest: No respiratory distress. She has no wheezes. She has no rales.   Abdominal: Soft. Bowel sounds are normal. There is no tenderness.   Musculoskeletal: She exhibits no edema.   Neurological: She is alert. Coordination abnormal.   Left side weak   Skin: Skin is warm and dry. No rash noted. She is not diaphoretic. No erythema.   Nursing note and vitals reviewed.      Labs:        Invalid input(s): [id number]      Recent Labs      01/19/17 2245 01/21/17   0305   SODIUM  136  138   POTASSIUM  4.7  3.7   CHLORIDE  101  110   CO2  25  21   BUN  23*  20   CREATININE  1.42*  0.86   CALCIUM  9.1  7.7*     Recent Labs      01/19/17 2245   17   0305   ALTSGPT  17   --    ASTSGOT  35   --    ALKPHOSPHAT  81   --    TBILIRUBIN  0.5   --    GLUCOSE  183*  113*     Recent Labs      17   0305   RBC  4.54  3.55*   HEMOGLOBIN  14.6  11.2*   HEMATOCRIT  42.4  34.0*   PLATELETCT  229  170   PROTHROMBTM  13.4   --    APTT  27.5   --    INR  0.99   --      Recent Labs      17   0305   WBC  8.9  6.6   NEUTSPOLYS  85.30*  70.70   LYMPHOCYTES  7.10*  16.40*   MONOCYTES  6.80  10.80   EOSINOPHILS  0.30  1.50   BASOPHILS  0.30  0.30   ASTSGOT  35   --    ALTSGPT  17   --    ALKPHOSPHAT  81   --    TBILIRUBIN  0.5   --            Hemodynamics:  Temp (24hrs), Av.4 °C (97.6 °F), Min:35.9 °C (96.7 °F), Max:36.7 °C (98.1 °F)  Temperature: 36.7 °C (98.1 °F)  Pulse  Av.1  Min: 48  Max: 80   Blood Pressure : 121/44 mmHg     Respiratory:    Respiration: 18, Pulse Oximetry: 96 %     Work Of Breathing / Effort: Mild  RUL Breath Sounds: Diminished;Clear, RML Breath Sounds: Diminished;Clear, RLL Breath Sounds: Diminished;Clear, ISAÍAS Breath Sounds: Diminished;Clear, LLL Breath Sounds: Diminished;Clear  Fluids:    Intake/Output Summary (Last 24 hours) at 17 1623  Last data filed at 17 0514   Gross per 24 hour   Intake   1566 ml   Output      0 ml   Net   1566 ml        GI/Nutrition:  Orders Placed This Encounter   Procedures   • Diet Order     Standing Status: Standing      Number of Occurrences: 1      Standing Expiration Date:      Order Specific Question:  Diet:     Answer:  Regular [1]     Medical Decision Making, by Problem:  Active Hospital Problems    Diagnosis   • Fall [W19.XXXA]   Fracture of L1 on xray  No fracture on MRI    Severe central canal stenosis on lumbar spine MRI  I did consult neurosurgery Dr. Wong who does not recommend surgical intervention  Continue physical therapy  Gabapentin for pain control   • Acute respiratory failure with hypoxia (CMS-HCC) [J96.01]  Due to  atelectasis  Encourage mobility and taper oxygen as tolerated    Recent stroke with left side weakness  Continue plavix and zocor   Ok to discharge to her skilled nursing facility    Labs reviewed, Radiology images reviewed and Medications reviewed  Mims catheter: No Mims      DVT Prophylaxis: Heparin    Ulcer prophylaxis: Not indicated    Assessed for rehab: Patient was assess for and/or received rehabilitation services during this hospitalization

## 2017-01-23 NOTE — PROGRESS NOTES
Pt is awake and oriented. Resting in bed. Does express relief of abdominal pain after BM last evening. No other needs at this time.

## 2017-01-23 NOTE — CARE PLAN
Problem: Safety  Goal: Will remain free from injury  Outcome: PROGRESSING AS EXPECTED    Problem: Bowel/Gastric:  Goal: Normal bowel function is maintained or improved  Outcome: PROGRESSING SLOWER THAN EXPECTED

## 2017-01-23 NOTE — PROGRESS NOTES
Problem: Venous Thromboembolism (VTW)/Deep Vein Thrombosis (DVT) Prevention:  Goal: Patient will participate in Venous Thrombosis (VTE)/Deep Vein Thrombosis (DVT)Prevention Measures  Outcome: PROGRESSING AS EXPECTED  Pt has DVT prophylaxis ordered. LMWH. No s/s of DVT at this time.    Problem: Respiratory:  Goal: Respiratory status will improve  Outcome: PROGRESSING AS EXPECTED  Breathing is even and unlabored. Pt is using 2L O2 and has sats in mid to upper 90s. Lung sounds are clear.    Problem: Pain Management  Goal: Pain level will decrease to patient’s comfort goal  Outcome: PROGRESSING AS EXPECTED  Pt has no c/o pain at this time. Repositioning for comfort.

## 2017-01-24 ENCOUNTER — RESOLUTE PROFESSIONAL BILLING HOSPITAL PROF FEE (OUTPATIENT)
Dept: HOSPITALIST | Facility: SKILLED NURSING FACILITY | Age: 82
End: 2017-01-24
Payer: MEDICARE

## 2017-01-24 VITALS
WEIGHT: 141.54 LBS | TEMPERATURE: 97.2 F | RESPIRATION RATE: 18 BRPM | HEIGHT: 59 IN | SYSTOLIC BLOOD PRESSURE: 117 MMHG | OXYGEN SATURATION: 96 % | DIASTOLIC BLOOD PRESSURE: 52 MMHG | BODY MASS INDEX: 28.53 KG/M2 | HEART RATE: 73 BPM

## 2017-01-24 LAB
ERYTHROCYTE [DISTWIDTH] IN BLOOD BY AUTOMATED COUNT: 44.3 FL (ref 35.9–50)
HCT VFR BLD AUTO: 33.5 % (ref 37–47)
HGB BLD-MCNC: 11.1 G/DL (ref 12–16)
MCH RBC QN AUTO: 30.4 PG (ref 27–33)
MCHC RBC AUTO-ENTMCNC: 33.1 G/DL (ref 33.6–35)
MCV RBC AUTO: 91.8 FL (ref 81.4–97.8)
PLATELET # BLD AUTO: 224 K/UL (ref 164–446)
PMV BLD AUTO: 9.5 FL (ref 9–12.9)
RBC # BLD AUTO: 3.65 M/UL (ref 4.2–5.4)
WBC # BLD AUTO: 8.9 K/UL (ref 4.8–10.8)

## 2017-01-24 PROCEDURE — 99239 HOSP IP/OBS DSCHRG MGMT >30: CPT | Performed by: FAMILY MEDICINE

## 2017-01-24 PROCEDURE — 85027 COMPLETE CBC AUTOMATED: CPT

## 2017-01-24 PROCEDURE — 700102 HCHG RX REV CODE 250 W/ 637 OVERRIDE(OP): Performed by: INTERNAL MEDICINE

## 2017-01-24 PROCEDURE — 700112 HCHG RX REV CODE 229: Performed by: INTERNAL MEDICINE

## 2017-01-24 PROCEDURE — A9270 NON-COVERED ITEM OR SERVICE: HCPCS | Performed by: INTERNAL MEDICINE

## 2017-01-24 PROCEDURE — 36415 COLL VENOUS BLD VENIPUNCTURE: CPT

## 2017-01-24 PROCEDURE — 97530 THERAPEUTIC ACTIVITIES: CPT

## 2017-01-24 PROCEDURE — 99306 1ST NF CARE HIGH MDM 50: CPT | Performed by: INTERNAL MEDICINE

## 2017-01-24 PROCEDURE — 700111 HCHG RX REV CODE 636 W/ 250 OVERRIDE (IP): Performed by: INTERNAL MEDICINE

## 2017-01-24 RX ORDER — GABAPENTIN 100 MG/1
100 CAPSULE ORAL 3 TIMES DAILY
Qty: 90 CAP | Status: SHIPPED | DISCHARGE
Start: 2017-01-24 | End: 2020-02-08

## 2017-01-24 RX ORDER — AMOXICILLIN 250 MG
1 CAPSULE ORAL DAILY
Qty: 30 TAB | Refills: 0 | Status: SHIPPED | DISCHARGE
Start: 2017-01-24 | End: 2020-02-08

## 2017-01-24 RX ORDER — PSEUDOEPHEDRINE HCL 30 MG
100 TABLET ORAL EVERY MORNING
Qty: 60 CAP | Status: SHIPPED | DISCHARGE
Start: 2017-01-24 | End: 2020-02-08

## 2017-01-24 RX ORDER — LACTULOSE 20 G/30ML
30 SOLUTION ORAL
Qty: 30 EACH | Status: SHIPPED | DISCHARGE
Start: 2017-01-24 | End: 2020-02-08

## 2017-01-24 RX ADMIN — CLOPIDOGREL 75 MG: 75 TABLET, FILM COATED ORAL at 08:49

## 2017-01-24 RX ADMIN — GABAPENTIN 100 MG: 100 CAPSULE ORAL at 08:49

## 2017-01-24 RX ADMIN — HEPARIN SODIUM 5000 UNITS: 5000 INJECTION INTRAVENOUS; SUBCUTANEOUS at 15:00

## 2017-01-24 RX ADMIN — GABAPENTIN 100 MG: 100 CAPSULE ORAL at 14:59

## 2017-01-24 RX ADMIN — DOCUSATE SODIUM 100 MG: 100 CAPSULE ORAL at 08:49

## 2017-01-24 RX ADMIN — HEPARIN SODIUM 5000 UNITS: 5000 INJECTION INTRAVENOUS; SUBCUTANEOUS at 05:43

## 2017-01-24 ASSESSMENT — GAIT ASSESSMENTS: GAIT LEVEL OF ASSIST: UNABLE TO PARTICIPATE

## 2017-01-24 NOTE — DISCHARGE INSTRUCTIONS
Discharge Instructions    Discharged to other by [facility] with escort. Discharged via wheelchair, hospital escort: Yes.  Special equipment needed: Not Applicable    Be sure to schedule a follow-up appointment with your primary care doctor or any specialists as instructed.     Discharge Plan:   Influenza Vaccine Indication: Patient Refuses    I understand that a diet low in cholesterol, fat, and sodium is recommended for good health. Unless I have been given specific instructions below for another diet, I accept this instruction as my diet prescription.   Other diet: regular diet    Special Instructions: None    · Is patient discharged on Warfarin / Coumadin?   No     · Is patient Post Blood Transfusion?  No    Depression / Suicide Risk    As you are discharged from this [facility], it is important to learn how to keep safe from harming yourself.    Recognize the warning signs:  · Abrupt changes in personality, positive or negative- including increase in energy   · Giving away possessions  · Change in eating patterns- significant weight changes-  positive or negative  · Change in sleeping patterns- unable to sleep or sleeping all the time   · Unwillingness or inability to communicate  · Depression  · Unusual sadness, discouragement and loneliness  · Talk of wanting to die  · Neglect of personal appearance   · Rebelliousness- reckless behavior  · Withdrawal from people/activities they love  · Confusion- inability to concentrate     If you or a loved one observes any of these behaviors or has concerns about self-harm, here's what you can do:  · Talk about it- your feelings and reasons for harming yourself  · Remove any means that you might use to hurt yourself (examples: pills, rope, extension cords, firearm)  · Get professional help from the community (Mental Health, Substance Abuse, psychological counseling)  · Do not be alone:Call your Safe Contact- someone whom you trust who will be there for  you.  · Call your local CRISIS HOTLINE 151-0282 or 588-424-7835  · Call your local Children's Mobile Crisis Response Team [place] (649) 671-4136 or www.[employer]  · Call the toll free National Suicide Prevention Hotlines   · National Suicide Prevention Lifeline 241-491-ZDGE (2061)  · National [place] Line Network 800-SUICIDE (126-4786)

## 2017-01-24 NOTE — DISCHARGE PLANNING
Received transport form from [facility]), however, [facility] does not currently have bed available for patient. Per La(CYNTHIA), patient has chosen [facility] as they do have bed available for patient. Arranged patients transport to [facility] at 1700 via [facility] van. [facility]) notified of transport time via phone.    Task ID#[id number]

## 2017-01-24 NOTE — THERAPY
"Occupational Therapy Treatment completed with focus on ADLs and ADL transfers.  Functional Status:  Pt [place] but very pleasant & co-operative.  Pt required Max A to don AFO & shoes.  Pt was Mod A for supine to sit EOB.  Pt transferred to BSC with Mod A.  Pt sat for 10 min but was successful with a large BM on BSC.  Pt stood for 3 min with Mod A with Nsg assisted with hygiene after BM.  Pt transferred to bedside chair for lunch with Mod A.   Pt very happy to be OOB for lunch.  Plan of Care: Will benefit from Occupational Therapy 3 times per week  Discharge Recommendations:  Equipment Will Continue to Assess for Equipment Needs. Post-acute therapy recommended before discharged home.    Pt would benefit from being OOB for all meals.    See \"Rehab Therapy-Acute\" Patient Summary Report for complete documentation.   "

## 2017-01-24 NOTE — DISCHARGE PLANNING
Pt has been cleared to transfer to SNF, transport form faxed to [facility] for [facility] as that is family's preference per notes.

## 2017-01-24 NOTE — PROGRESS NOTES
Rcv'd call from John regarding preference of [facility] as SNF facility. Pt's PCP services both [facility] and pt's primary residence.

## 2017-01-24 NOTE — CARE PLAN
Problem: Safety  Goal: Will remain free from injury  Outcome: PROGRESSING AS EXPECTED    Problem: Pain Management  Goal: Pain level will decrease to patient’s comfort goal  Outcome: PROGRESSING AS EXPECTED

## 2017-01-24 NOTE — DISCHARGE SUMMARY
DISCHARGE DIAGNOSES:  1.  Fall.  2.  Intractable back pain.  3.  Lumbar spinal stenosis.  4.  Acute respiratory failure with hypoxia.  5.  History of stroke.    CONSULTS:  Neurosurgery, Dr. Markus Wong.    PROCEDURES:  1.  CT scan of the chest, which showed a left lower lobe pulmonary nodule 7x4   mm.  2.  MRI of the lumbar spine, which showed minimal grade I spondylolisthesis   L4-L5 level with severe central canal stenosis at the L4-L5 level.  3.  V/Q scan is negative, which showed low probability for pulmonary embolus.  4.  Venous duplex, which was negative for DVT.    HISTORY OF PRESENT ILLNESS:  For detailed H and P, please see Dr. Ragland' note   on 1/20/2017, brief summary, a 89-year-old white female apparently fell down   to the floor, started having low back pain.  She was then brought to the   emergency room where because of intractable back pain, she was admitted for   further evaluation and management.    HOSPITAL COURSE:  Upon admission, MRI of the lumbar spine showed some lumbar   spinal stenosis.  Neurosurgery was then consulted who felt that the patient at   this point did not require any surgery, as they felt that her pain was more   musculoskeletal in origin.  She also appeared to need oxygen supplementation   during her time here workup was negative for DVT.  CT scan of the chest was   fairly negative, except for some small bilateral pleural effusions and   bibasilar atelectasis.  Patient denied having fever here and her white count   did not come up.  Patient was evaluated with physical therapy.  She will need   continued rehabilitation at skilled nursing facility.  She will now be   discharged improved and in stable condition.    DISCHARGE MEDICATIONS:  1.  Docusate 100 mg per day.  2.  Gabapentin 100 mg 3 times a day.  3.  Lactulose 30 mL as needed.  4.  Senna and docusate 1 tab per day.  5.  Tylenol 500 mg every 4 hours as needed for pain.  6.  Artificial tears.  7.  Plavix 75 mg per day.  8.   Magnesium oxide 400 mg per day.  9.  Zocor 40 mg at bedtime.  10.  Tramadol 50 mg every 6 hours as needed for pain.  11.  Viactiv 1 tablet daily in the evening.    DIET:  Regular.    ACTIVITY:  As tolerated.    FOLLOWUP:  Follow up with Dr. Thong Alvarez in 4 weeks.  Follow up with   neurosurgery, Dr. Markus Wong in 2 weeks.    Total discharge time was 35 minutes.       ____________________________________     MD MELVIN HAHN / KHUSHBU    DD:  01/24/2017 12:19:14  DT:  01/24/2017 13:11:46    D#:  [id number]  Job#:  [id number]

## 2017-01-24 NOTE — DISCHARGE PLANNING
SW spoke to pt's daughter by phone, who stated that if [facility] can take pt today, that would be her preference. [facility] confirmed an available bed with [facility] and arranged transport for 1700 - Pt's daughter and RN notified.   Transfer packet and cobra placed with pt's chart.

## 2017-01-24 NOTE — THERAPY
"Physical Therapy Treatment completed.   Bed Mobility:  Supine to Sit: Moderate Assist  Transfers: Sit to Stand: Moderate Assist (from elevated bed height)  Gait: Level Of Assist: Unable to Participate with patient non-ambulatory, wheel chair level       Plan of Care: Will benefit from Physical Therapy 5 times per week  Discharge Recommendations: Equipment: No Equipment Needed. Post-acute therapy recommended before discharged home.     See \"Rehab Therapy-Acute\" Patient Summary Report for complete documentation.     The patient did not c/o pain during treatment(slight discomfort when going from supine > sit). She was pleasant and cooperative and assisted with her mobility as able.  "

## 2017-01-25 ENCOUNTER — HOSPITAL ENCOUNTER (OUTPATIENT)
Dept: LAB | Facility: MEDICAL CENTER | Age: 82
End: 2017-01-25
Attending: INTERNAL MEDICINE
Payer: COMMERCIAL

## 2017-01-25 LAB
ANION GAP SERPL CALC-SCNC: 4 MMOL/L (ref 0–11.9)
BASOPHILS # BLD AUTO: 0.07 K/UL (ref 0–0.12)
BASOPHILS NFR BLD AUTO: 0.8 % (ref 0–1.8)
BUN SERPL-MCNC: 9 MG/DL (ref 8–22)
CALCIUM SERPL-MCNC: 8.6 MG/DL (ref 8.5–10.5)
CHLORIDE SERPL-SCNC: 106 MMOL/L (ref 96–112)
CO2 SERPL-SCNC: 30 MMOL/L (ref 20–33)
CREAT SERPL-MCNC: 0.5 MG/DL (ref 0.5–1.4)
EOSINOPHIL # BLD: 0.33 K/UL (ref 0–0.51)
EOSINOPHIL NFR BLD AUTO: 3.7 % (ref 0–6.9)
ERYTHROCYTE [DISTWIDTH] IN BLOOD BY AUTOMATED COUNT: 45.3 FL (ref 35.9–50)
GLUCOSE SERPL-MCNC: 119 MG/DL (ref 65–99)
HCT VFR BLD AUTO: 36.6 % (ref 37–47)
HGB BLD-MCNC: 11.9 G/DL (ref 12–16)
IMM GRANULOCYTES # BLD AUTO: 0.08 K/UL (ref 0–0.11)
IMM GRANULOCYTES NFR BLD AUTO: 0.9 % (ref 0–0.9)
LYMPHOCYTES # BLD: 2.41 K/UL (ref 1–4.8)
LYMPHOCYTES NFR BLD AUTO: 26.7 % (ref 22–41)
MCH RBC QN AUTO: 30.3 PG (ref 27–33)
MCHC RBC AUTO-ENTMCNC: 32.5 G/DL (ref 33.6–35)
MCV RBC AUTO: 93.1 FL (ref 81.4–97.8)
MONOCYTES # BLD: 0.84 K/UL (ref 0–0.85)
MONOCYTES NFR BLD AUTO: 9.3 % (ref 0–13.4)
NEUTROPHILS # BLD: 5.31 K/UL (ref 2–7.15)
NEUTROPHILS NFR BLD AUTO: 58.6 % (ref 44–72)
NRBC # BLD AUTO: 0 K/UL
NRBC BLD-RTO: 0 /100 WBC
PLATELET # BLD AUTO: 264 K/UL (ref 164–446)
PMV BLD AUTO: 9.5 FL (ref 9–12.9)
POTASSIUM SERPL-SCNC: 3.2 MMOL/L (ref 3.6–5.5)
RBC # BLD AUTO: 3.93 M/UL (ref 4.2–5.4)
SODIUM SERPL-SCNC: 140 MMOL/L (ref 135–145)
WBC # BLD AUTO: 9 K/UL (ref 4.8–10.8)

## 2017-01-25 PROCEDURE — 99309 SBSQ NF CARE MODERATE MDM 30: CPT | Performed by: INTERNAL MEDICINE

## 2017-01-26 LAB
ANION GAP SERPL CALC-SCNC: 7 MMOL/L (ref 0–11.9)
BUN SERPL-MCNC: 7 MG/DL (ref 8–22)
CALCIUM SERPL-MCNC: 8.8 MG/DL (ref 8.5–10.5)
CHLORIDE SERPL-SCNC: 104 MMOL/L (ref 96–112)
CO2 SERPL-SCNC: 30 MMOL/L (ref 20–33)
CREAT SERPL-MCNC: 0.49 MG/DL (ref 0.5–1.4)
GLUCOSE SERPL-MCNC: 126 MG/DL (ref 65–99)
MAGNESIUM SERPL-MCNC: 1.9 MG/DL (ref 1.5–2.5)
POTASSIUM SERPL-SCNC: 4 MMOL/L (ref 3.6–5.5)
SODIUM SERPL-SCNC: 141 MMOL/L (ref 135–145)

## 2017-01-27 PROCEDURE — 99316 NF DSCHRG MGMT 30 MIN+: CPT | Performed by: INTERNAL MEDICINE

## 2017-06-14 ENCOUNTER — HOSPITAL ENCOUNTER (OUTPATIENT)
Dept: RADIOLOGY | Facility: MEDICAL CENTER | Age: 82
End: 2017-06-14
Attending: OTOLARYNGOLOGY
Payer: MEDICARE

## 2017-06-14 DIAGNOSIS — H60.312 DIFFUSE OTITIS EXTERNA OF LEFT EAR, UNSPECIFIED CHRONICITY: ICD-10-CM

## 2017-06-14 DIAGNOSIS — H90.3 SENSORY HEARING LOSS, BILATERAL: ICD-10-CM

## 2017-06-14 PROCEDURE — 70480 CT ORBIT/EAR/FOSSA W/O DYE: CPT

## 2017-09-25 ENCOUNTER — OFFICE VISIT (OUTPATIENT)
Dept: URGENT CARE | Facility: PHYSICIAN GROUP | Age: 82
End: 2017-09-25
Payer: MEDICARE

## 2017-09-25 VITALS
TEMPERATURE: 98.9 F | BODY MASS INDEX: 26 KG/M2 | SYSTOLIC BLOOD PRESSURE: 108 MMHG | DIASTOLIC BLOOD PRESSURE: 64 MMHG | WEIGHT: 129 LBS | HEART RATE: 61 BPM | OXYGEN SATURATION: 95 % | HEIGHT: 59 IN

## 2017-09-25 DIAGNOSIS — R13.10 DYSPHAGIA, UNSPECIFIED TYPE: ICD-10-CM

## 2017-09-25 PROCEDURE — 99203 OFFICE O/P NEW LOW 30 MIN: CPT | Performed by: NURSE PRACTITIONER

## 2017-09-25 NOTE — PROGRESS NOTES
"Subjective:      Pau Escalera is a 89 y.o. female who presents with Difficulty Swallowing (- difficulty swallowing food. x1 day )            HPI New problem. 89 year old female presenting with one day history of difficulty swallowing. She was at breakfast at 1030 and she could not swallow her food at that time. States she thinks she may have mucous in back of throat. Of note, she had a stroke in 2006 and has hemiparesis of left side. Denies pain with swallowing, fever or chills.  Allergies, medications and history reviewed by me today      ROS  Please see HPI       Objective:     /64   Pulse 61   Temp 37.2 °C (98.9 °F)   Ht 1.499 m (4' 11\")   Wt 58.5 kg (129 lb)   SpO2 95%   Breastfeeding? No   BMI 26.05 kg/m²      Physical Exam   Constitutional: She is oriented to person, place, and time. She appears well-developed and well-nourished. No distress.   HENT:   Head: Normocephalic and atraumatic.   Right Ear: External ear and ear canal normal. Tympanic membrane is not injected and not perforated. No middle ear effusion.   Left Ear: External ear and ear canal normal. Tympanic membrane is not injected and not perforated.  No middle ear effusion.   Nose: No mucosal edema or rhinorrhea.   Mouth/Throat: No uvula swelling. No oropharyngeal exudate or posterior oropharyngeal erythema.   Swallowed for me here in clinic.   Eyes: Conjunctivae are normal. Right eye exhibits no discharge. Left eye exhibits no discharge.   Neck: Normal range of motion. Neck supple.   Cardiovascular: Normal rate, regular rhythm and normal heart sounds.    No murmur heard.  Pulmonary/Chest: Effort normal and breath sounds normal. No respiratory distress.   Musculoskeletal:   She has limited mobility of left side. In wheelchair.   Lymphadenopathy:     She has no cervical adenopathy.        Right: No supraclavicular adenopathy present.        Left: No supraclavicular adenopathy present.   Neurological: She is alert and oriented to " person, place, and time. Gait normal.   Skin: Skin is warm and dry.   Psychiatric: She has a normal mood and affect. Her behavior is normal. Thought content normal.               Assessment/Plan:     1. Dysphagia, unspecified type       At this time observation. Advised on different treatment options of GI referral versus swallow study. At this time her daughter would like to wait and see how she does. Recommend soft diet for her.

## 2018-11-24 ENCOUNTER — OFFICE VISIT (OUTPATIENT)
Dept: URGENT CARE | Facility: PHYSICIAN GROUP | Age: 83
End: 2018-11-24
Payer: MEDICARE

## 2018-11-24 VITALS
OXYGEN SATURATION: 93 % | DIASTOLIC BLOOD PRESSURE: 70 MMHG | TEMPERATURE: 98.5 F | HEART RATE: 82 BPM | SYSTOLIC BLOOD PRESSURE: 110 MMHG | RESPIRATION RATE: 18 BRPM | HEIGHT: 60 IN | BODY MASS INDEX: 24.74 KG/M2 | WEIGHT: 126 LBS

## 2018-11-24 DIAGNOSIS — K52.9 AGE (ACUTE GASTROENTERITIS): ICD-10-CM

## 2018-11-24 PROCEDURE — 99214 OFFICE O/P EST MOD 30 MIN: CPT | Performed by: FAMILY MEDICINE

## 2018-11-24 RX ORDER — CRANBERRY FRUIT EXTRACT 250 MG
250 CAPSULE ORAL 2 TIMES DAILY
COMMUNITY
Start: 2020-02-13

## 2018-11-24 NOTE — LETTER
November 24, 2018         Patient: Pau Escalera   YOB: 1926   Date of Visit: 11/24/2018           To Whom it May Concern:    Pau Escalera was seen in my clinic on 11/24/2018. Please allow her to take Imodium while in nursing home.     If you have any questions or concerns, please don't hesitate to call.        Sincerely,           Vidal Salgado M.D.  Electronically Signed

## 2018-11-24 NOTE — PATIENT INSTRUCTIONS
Food Choices to Help Relieve Diarrhea, Adult  When you have diarrhea, the foods you eat and your eating habits are very important. Choosing the right foods and drinks can help relieve diarrhea. Also, because diarrhea can last up to 7 days, you need to replace lost fluids and electrolytes (such as sodium, potassium, and chloride) in order to help prevent dehydration.   WHAT GENERAL GUIDELINES DO I NEED TO FOLLOW?  · Slowly drink 1 cup (8 oz) of fluid for each episode of diarrhea. If you are getting enough fluid, your urine will be clear or pale yellow.  · Eat starchy foods. Some good choices include white rice, white toast, pasta, low-fiber cereal, baked potatoes (without the skin), saltine crackers, and bagels.  · Avoid large servings of any cooked vegetables.  · Limit fruit to two servings per day. A serving is ½ cup or 1 small piece.  · Choose foods with less than 2 g of fiber per serving.  · Limit fats to less than 8 tsp (38 g) per day.  · Avoid fried foods.  · Eat foods that have probiotics in them. Probiotics can be found in certain dairy products.  · Avoid foods and beverages that may increase the speed at which food moves through the stomach and intestines (gastrointestinal tract). Things to avoid include:  ¨ High-fiber foods, such as dried fruit, raw fruits and vegetables, nuts, seeds, and whole grain foods.  ¨ Spicy foods and high-fat foods.  ¨ Foods and beverages sweetened with high-fructose corn syrup, honey, or sugar alcohols such as xylitol, sorbitol, and mannitol.  WHAT FOODS ARE RECOMMENDED?  Grains  White rice. White, [place], or ash breads (fresh or toasted), including plain rolls, buns, or bagels. White pasta. Saltine, soda, or jose enrique crackers. Pretzels. Low-fiber cereal. Cooked cereals made with water (such as cornmeal, farina, or cream cereals). Plain muffins. Matzo. [place] toast. Zwieback.   Vegetables  Potatoes (without the skin). Strained tomato and vegetable juices. Most well-cooked and canned  vegetables without seeds. Tender lettuce.  Fruits  Cooked or canned applesauce, apricots, cherries, fruit cocktail, grapefruit, peaches, pears, or plums. Fresh bananas, apples without skin, cherries, grapes, cantaloupe, grapefruit, peaches, oranges, or plums.   Meat and Other Protein Products  Baked or boiled chicken. Eggs. Tofu. Fish. Seafood. Smooth peanut butter. Ground or well-cooked tender beef, ham, veal, lamb, pork, or poultry.   Dairy  Plain yogurt, kefir, and unsweetened liquid yogurt. Lactose-free milk, buttermilk, or soy milk. Plain hard cheese.  Beverages  Sport drinks. Clear broths. Diluted fruit juices (except prune). Regular, caffeine-free sodas such as ginger ale. Water. Decaffeinated teas. Oral rehydration solutions. Sugar-free beverages not sweetened with sugar alcohols.  Other  Bouillon, broth, or soups made from recommended foods.   The items listed above may not be a complete list of recommended foods or beverages. Contact your dietitian for more options.  WHAT FOODS ARE NOT RECOMMENDED?  Grains  Whole grain, whole wheat, bran, or rye breads, rolls, pastas, crackers, and cereals. Wild or brown rice. Cereals that contain more than 2 g of fiber per serving. Corn tortillas or taco shells. Cooked or dry oatmeal. Granola. Popcorn.  Vegetables  Raw vegetables. Cabbage, broccoli, [place] sprouts, artichokes, baked beans, beet greens, corn, kale, legumes, peas, sweet potatoes, and yams. Potato skins. Cooked spinach and cabbage.  Fruits  Dried fruit, including raisins and dates. Raw fruits. Stewed or dried prunes. Fresh apples with skin, apricots, mangoes, pears, raspberries, and strawberries.   Meat and Other Protein Products  [place] peanut butter. Nuts and seeds. Beans and lentils. [place].   Dairy  High-fat cheeses. Milk, chocolate milk, and beverages made with milk, such as milk shakes. Cream. Ice cream.  Sweets and Desserts  Sweet rolls, doughnuts, and sweet breads. Pancakes and waffles.  Fats and  Oils  Butter. Cream sauces. Margarine. Salad oils. Plain salad dressings. Olives. Avocados.   Beverages  Caffeinated beverages (such as coffee, tea, soda, or energy drinks). Alcoholic beverages. Fruit juices with pulp. Prune juice. Soft drinks sweetened with high-fructose corn syrup or sugar alcohols.  Other  Coconut. Hot sauce. Chili powder. Mayonnaise. Gravy. Cream-based or milk-based soups.   The items listed above may not be a complete list of foods and beverages to avoid. Contact your dietitian for more information.  WHAT SHOULD I DO IF I BECOME DEHYDRATED?  Diarrhea can sometimes lead to dehydration. Signs of dehydration include dark urine and dry mouth and skin. If you think you are dehydrated, you should rehydrate with an oral rehydration solution. These solutions can be purchased at pharmacies, retail stores, or online.   Drink ½-1 cup (120-240 mL) of oral rehydration solution each time you have an episode of diarrhea. If drinking this amount makes your diarrhea worse, try drinking smaller amounts more often. For example, drink 1-3 tsp (5-15 mL) every 5-10 minutes.   A general rule for staying hydrated is to drink 1½-2 L of fluid per day. Talk to your health care provider about the specific amount you should be drinking each day. Drink enough fluids to keep your urine clear or pale yellow.     This information is not intended to replace advice given to you by your health care provider. Make sure you discuss any questions you have with your health care provider.     Document Released: 03/09/2005 Document Revised: 01/08/2016 Document Reviewed: 11/10/2014  [employer] Patient Education ©2016 [employer]

## 2018-11-27 NOTE — PROGRESS NOTES
Chief Complaint   Patient presents with   • Diarrhea     x last night// vomiting// odor// stool watery and almost clear         N/V    This is a new problem. The current episode started last night.        she has had several episodes of emesis, diarrhea since 4 am . The problem has been unchanged.  no blood in stool.  Associated symptoms include :  fatigue. Pertinent negatives include no abdominal pain, chills, coughing, fever, headaches, or weight loss. Nothing aggravates the symptoms.  she has been able to tolerate some fluids without emesis over the past couple of hours.     There are no known risk factors. Patient has tried nothing for the symptoms. There is no history of inflammatory bowel disease.     Past Medical History:   Diagnosis Date   • Arthritis    • Cancer (HCC) 2013    melanoma  right leg  surgery pending   • CATARACT     ebony IOL   • Snoring    • Stroke (HCC) 2006    left sided paralysis   • Unspecified urinary incontinence    • Urinary bladder disorder        Social History   Substance Use Topics   • Smoking status: Never Smoker   • Smokeless tobacco: Never Used   • Alcohol use No                  Review of Systems   Constitutional: Negative for fever, chills and weight loss.   Respiratory: Negative for cough and wheezing.    Cardiovascular: Negative for chest pain.   Gastrointestinal:   Negative for  blood in stool.   Neurological: Negative for dizziness and headaches.   All other systems reviewed and are negative.         Objective:     Blood pressure 110/70, pulse 82, temperature 36.9 °C (98.5 °F), temperature source Temporal, resp. rate 18, height 1.524 m (5'), weight 57.2 kg (126 lb), SpO2 93 %, not currently breastfeeding.      Physical Exam   Constitutional: pt is oriented to person, place, and time and appears well-developed. No distress.   HENT:   Head: Normocephalic and atraumatic.   Mouth/Throat: No oropharyngeal exudate.   Eyes: Conjunctivae are normal. No scleral icterus.    Cardiovascular: Normal rate, regular rhythm and normal heart sounds.    Pulmonary/Chest: Effort normal and breath sounds normal. No respiratory distress. Pt has no wheezes. Pt has no rales.   Abdominal: Normal appearance and bowel sounds are normal. There is no splenomegaly or hepatomegaly. There is no tenderness. There is no rebound, no guarding, no CVA tenderness and no tenderness at McBurney's point.   Lymphadenopathy:     Pt has no cervical adenopathy.   Neurological: pt is alert and oriented to person, place, and time.   Skin: Skin is warm. Pt is not diaphoretic. No erythema.   Psychiatric:  behavior is normal.   Nursing note and vitals reviewed.              Assessment/Plan:         1. Viral gastroenteritis  Able to pass PO challenge     BRAT diet x 24 hr     F/u in 2 d if sx not improved

## 2018-12-21 ENCOUNTER — APPOINTMENT (RX ONLY)
Dept: URBAN - METROPOLITAN AREA CLINIC 20 | Facility: CLINIC | Age: 83
Setting detail: DERMATOLOGY
End: 2018-12-21

## 2018-12-21 DIAGNOSIS — L57.0 ACTINIC KERATOSIS: ICD-10-CM

## 2018-12-21 DIAGNOSIS — L82.1 OTHER SEBORRHEIC KERATOSIS: ICD-10-CM

## 2018-12-21 DIAGNOSIS — Z71.89 OTHER SPECIFIED COUNSELING: ICD-10-CM

## 2018-12-21 DIAGNOSIS — D18.0 HEMANGIOMA: ICD-10-CM

## 2018-12-21 DIAGNOSIS — D22 MELANOCYTIC NEVI: ICD-10-CM

## 2018-12-21 DIAGNOSIS — Z85.828 PERSONAL HISTORY OF OTHER MALIGNANT NEOPLASM OF SKIN: ICD-10-CM

## 2018-12-21 DIAGNOSIS — L81.4 OTHER MELANIN HYPERPIGMENTATION: ICD-10-CM

## 2018-12-21 PROBLEM — D22.39 MELANOCYTIC NEVI OF OTHER PARTS OF FACE: Status: ACTIVE | Noted: 2018-12-21

## 2018-12-21 PROBLEM — D22.5 MELANOCYTIC NEVI OF TRUNK: Status: ACTIVE | Noted: 2018-12-21

## 2018-12-21 PROBLEM — D22.62 MELANOCYTIC NEVI OF LEFT UPPER LIMB, INCLUDING SHOULDER: Status: ACTIVE | Noted: 2018-12-21

## 2018-12-21 PROBLEM — D22.61 MELANOCYTIC NEVI OF RIGHT UPPER LIMB, INCLUDING SHOULDER: Status: ACTIVE | Noted: 2018-12-21

## 2018-12-21 PROBLEM — D22.72 MELANOCYTIC NEVI OF LEFT LOWER LIMB, INCLUDING HIP: Status: ACTIVE | Noted: 2018-12-21

## 2018-12-21 PROBLEM — D18.01 HEMANGIOMA OF SKIN AND SUBCUTANEOUS TISSUE: Status: ACTIVE | Noted: 2018-12-21

## 2018-12-21 PROBLEM — D22.71 MELANOCYTIC NEVI OF RIGHT LOWER LIMB, INCLUDING HIP: Status: ACTIVE | Noted: 2018-12-21

## 2018-12-21 PROCEDURE — 99203 OFFICE O/P NEW LOW 30 MIN: CPT | Mod: 25

## 2018-12-21 PROCEDURE — ? COUNSELING

## 2018-12-21 PROCEDURE — ? LIQUID NITROGEN

## 2018-12-21 PROCEDURE — ? SUNSCREEN RECOMMENDATIONS

## 2018-12-21 PROCEDURE — 17003 DESTRUCT PREMALG LES 2-14: CPT

## 2018-12-21 PROCEDURE — 17000 DESTRUCT PREMALG LESION: CPT

## 2018-12-21 ASSESSMENT — LOCATION DETAILED DESCRIPTION DERM
LOCATION DETAILED: LEFT DISTAL POSTERIOR THIGH
LOCATION DETAILED: INFERIOR THORACIC SPINE
LOCATION DETAILED: LEFT VENTRAL PROXIMAL FOREARM
LOCATION DETAILED: LEFT PROXIMAL POSTERIOR UPPER ARM
LOCATION DETAILED: RIGHT PROXIMAL PRETIBIAL REGION
LOCATION DETAILED: RIGHT INFERIOR CENTRAL MALAR CHEEK
LOCATION DETAILED: RIGHT DISTAL POSTERIOR THIGH
LOCATION DETAILED: RIGHT DISTAL POSTERIOR UPPER ARM
LOCATION DETAILED: RIGHT SUPERIOR LATERAL LOWER BACK
LOCATION DETAILED: LEFT INFERIOR LATERAL NECK
LOCATION DETAILED: RIGHT SUPERIOR UPPER BACK
LOCATION DETAILED: RIGHT INFERIOR MEDIAL UPPER BACK
LOCATION DETAILED: LEFT INFERIOR LATERAL MALAR CHEEK
LOCATION DETAILED: RIGHT DISTAL CALF
LOCATION DETAILED: LEFT LATERAL PROXIMAL PRETIBIAL REGION
LOCATION DETAILED: RIGHT INFERIOR MEDIAL MIDBACK
LOCATION DETAILED: RIGHT INFERIOR FOREHEAD
LOCATION DETAILED: RIGHT VENTRAL PROXIMAL FOREARM

## 2018-12-21 ASSESSMENT — LOCATION SIMPLE DESCRIPTION DERM
LOCATION SIMPLE: LEFT ANTERIOR NECK
LOCATION SIMPLE: LEFT FOREARM
LOCATION SIMPLE: RIGHT CHEEK
LOCATION SIMPLE: RIGHT CALF
LOCATION SIMPLE: RIGHT LOWER BACK
LOCATION SIMPLE: LEFT CHEEK
LOCATION SIMPLE: RIGHT UPPER BACK
LOCATION SIMPLE: RIGHT POSTERIOR THIGH
LOCATION SIMPLE: LEFT PRETIBIAL REGION
LOCATION SIMPLE: RIGHT FOREHEAD
LOCATION SIMPLE: RIGHT PRETIBIAL REGION
LOCATION SIMPLE: LEFT POSTERIOR UPPER ARM
LOCATION SIMPLE: RIGHT POSTERIOR UPPER ARM
LOCATION SIMPLE: UPPER BACK
LOCATION SIMPLE: RIGHT FOREARM
LOCATION SIMPLE: LEFT POSTERIOR THIGH

## 2018-12-21 ASSESSMENT — LOCATION ZONE DERM
LOCATION ZONE: LEG
LOCATION ZONE: ARM
LOCATION ZONE: FACE
LOCATION ZONE: NECK
LOCATION ZONE: TRUNK

## 2018-12-21 NOTE — PROCEDURE: LIQUID NITROGEN
Consent: The patient's consent was obtained including but not limited to risks of crusting, scabbing, blistering, scarring, darker or lighter pigmentary change, recurrence, incomplete removal and infection. RTC in 2 months if lesion(s) persistent.
Post-Care Instructions: I reviewed with the patient in detail post-care instructions. Patient is to wear sunprotection, and avoid picking at any of the treated lesions. Pt may apply Vaseline to crusted or scabbing areas.
Duration Of Freeze Thaw-Cycle (Seconds): 10
Number Of Freeze-Thaw Cycles: 2 freeze-thaw cycles
Detail Level: Detailed
Render Post-Care Instructions In Note?: yes

## 2018-12-21 NOTE — HPI: FULL BODY SKIN EXAMINATION
How Severe Are Your Spot(S)?: mild
What Type Of Note Output Would You Prefer (Optional)?: Standard Output
What Is The Reason For Today's Visit?: Full Body Skin Examination with No Concerns
What Is The Reason For Today's Visit? (Being Monitored For X): concerning skin lesions on an annual basis
Additional History: Presents with daughter today.

## 2019-02-01 ENCOUNTER — HOSPITAL ENCOUNTER (OUTPATIENT)
Dept: LAB | Facility: MEDICAL CENTER | Age: 84
End: 2019-02-01
Attending: NURSE PRACTITIONER
Payer: MEDICARE

## 2019-02-01 LAB
ALBUMIN SERPL BCP-MCNC: 3.9 G/DL (ref 3.2–4.9)
ALBUMIN/GLOB SERPL: 1.5 G/DL
ALP SERPL-CCNC: 59 U/L (ref 30–99)
ALT SERPL-CCNC: 15 U/L (ref 2–50)
ANION GAP SERPL CALC-SCNC: 4 MMOL/L (ref 0–11.9)
AST SERPL-CCNC: 19 U/L (ref 12–45)
BILIRUB SERPL-MCNC: 0.6 MG/DL (ref 0.1–1.5)
BUN SERPL-MCNC: 21 MG/DL (ref 8–22)
CALCIUM SERPL-MCNC: 9.3 MG/DL (ref 8.5–10.5)
CHLORIDE SERPL-SCNC: 104 MMOL/L (ref 96–112)
CHOLEST SERPL-MCNC: 154 MG/DL (ref 100–199)
CO2 SERPL-SCNC: 30 MMOL/L (ref 20–33)
CREAT SERPL-MCNC: 0.7 MG/DL (ref 0.5–1.4)
FASTING STATUS PATIENT QL REPORTED: NORMAL
GLOBULIN SER CALC-MCNC: 2.6 G/DL (ref 1.9–3.5)
GLUCOSE SERPL-MCNC: 111 MG/DL (ref 65–99)
HDLC SERPL-MCNC: 53 MG/DL
LDLC SERPL CALC-MCNC: 76 MG/DL
POTASSIUM SERPL-SCNC: 4.1 MMOL/L (ref 3.6–5.5)
PROT SERPL-MCNC: 6.5 G/DL (ref 6–8.2)
SODIUM SERPL-SCNC: 138 MMOL/L (ref 135–145)
TRIGL SERPL-MCNC: 125 MG/DL (ref 0–149)

## 2019-02-01 PROCEDURE — 36415 COLL VENOUS BLD VENIPUNCTURE: CPT

## 2019-02-01 PROCEDURE — 80061 LIPID PANEL: CPT

## 2019-02-01 PROCEDURE — 80053 COMPREHEN METABOLIC PANEL: CPT

## 2019-08-30 ENCOUNTER — HOSPITAL ENCOUNTER (OUTPATIENT)
Dept: RADIOLOGY | Facility: MEDICAL CENTER | Age: 84
End: 2019-08-30
Attending: FAMILY MEDICINE
Payer: MEDICARE

## 2019-08-30 ENCOUNTER — OFFICE VISIT (OUTPATIENT)
Dept: URGENT CARE | Facility: PHYSICIAN GROUP | Age: 84
End: 2019-08-30
Payer: MEDICARE

## 2019-08-30 VITALS
HEART RATE: 65 BPM | OXYGEN SATURATION: 95 % | RESPIRATION RATE: 14 BRPM | WEIGHT: 118 LBS | HEIGHT: 60 IN | SYSTOLIC BLOOD PRESSURE: 108 MMHG | BODY MASS INDEX: 23.16 KG/M2 | DIASTOLIC BLOOD PRESSURE: 64 MMHG | TEMPERATURE: 97.3 F

## 2019-08-30 DIAGNOSIS — M25.472 LEFT ANKLE SWELLING: ICD-10-CM

## 2019-08-30 DIAGNOSIS — M79.662 PAIN OF LEFT CALF: ICD-10-CM

## 2019-08-30 PROCEDURE — 99214 OFFICE O/P EST MOD 30 MIN: CPT | Performed by: FAMILY MEDICINE

## 2019-08-30 PROCEDURE — 93971 EXTREMITY STUDY: CPT | Mod: LT

## 2019-08-30 ASSESSMENT — ENCOUNTER SYMPTOMS
PALPITATIONS: 0
EYE DISCHARGE: 0
MYALGIAS: 0
EYE REDNESS: 0
VOMITING: 0
NAUSEA: 0
WEIGHT LOSS: 0

## 2019-08-30 NOTE — PROGRESS NOTES
Subjective:      Pau Escalera is a 89 y.o. female who presents with Leg Swelling (Left leg pain and swelling x 1 week or so)            1 week left foot and ankle swelling. Calf pain. PCP is concerned about DVT. No known trauma. She has PMH stroke and wears an AFO on that ankle for foot drop. No PMH DVT. +PMH cancer. No CP or SOB. No other aggravating or alleviating factors.          Review of Systems   Constitutional: Negative for malaise/fatigue and weight loss.   Eyes: Negative for discharge and redness.   Cardiovascular: Negative for chest pain and palpitations.   Gastrointestinal: Negative for nausea and vomiting.   Musculoskeletal: Negative for joint pain and myalgias.   Skin: Negative for itching and rash.          Objective:     /64   Pulse 65   Temp 36.3 °C (97.3 °F)   Resp 14   Ht 1.524 m (5')   Wt 53.5 kg (118 lb)   SpO2 95%   BMI 23.05 kg/m²      Physical Exam   Constitutional: She is oriented to person, place, and time. She appears well-developed and well-nourished. No distress.   HENT:   Head: Normocephalic and atraumatic.   Eyes: Conjunctivae are normal.   Neck: Neck supple. No JVD present.   Cardiovascular: Normal rate, regular rhythm and normal heart sounds.   No murmur heard.  Pulmonary/Chest: Effort normal and breath sounds normal. She has no wheezes.   Musculoskeletal: She exhibits edema ( Left ankle and pretibial region.  2+.  Tender left calf.  No cords appreciated.).   Neurological: She is alert and oriented to person, place, and time.   Skin: Skin is warm and dry. No rash noted.               Assessment/Plan:   Ultrasound: No DVT per radiology.    1. Left ankle swelling  US-EXTREMITY VENOUS LOWER UNILAT LEFT   2. Pain of left calf  US-EXTREMITY VENOUS LOWER UNILAT LEFT     Differential diagnosis, natural history, supportive care, and indications for immediate follow-up discussed at length.   Rule out DVT  Elevate leg  Follow-up primary care

## 2020-01-13 ENCOUNTER — HOSPITAL ENCOUNTER (OUTPATIENT)
Facility: MEDICAL CENTER | Age: 85
End: 2020-01-13
Attending: INTERNAL MEDICINE
Payer: MEDICARE

## 2020-01-13 LAB
BACTERIA #/AREA URNS HPF: ABNORMAL /HPF
EPI CELLS #/AREA URNS HPF: ABNORMAL /HPF
HYALINE CASTS #/AREA URNS LPF: ABNORMAL /LPF
RBC # URNS HPF: ABNORMAL /HPF
WBC #/AREA URNS HPF: ABNORMAL /HPF

## 2020-01-13 PROCEDURE — 87077 CULTURE AEROBIC IDENTIFY: CPT

## 2020-01-13 PROCEDURE — 87086 URINE CULTURE/COLONY COUNT: CPT

## 2020-01-13 PROCEDURE — 81001 URINALYSIS AUTO W/SCOPE: CPT

## 2020-01-13 PROCEDURE — 87186 SC STD MICRODIL/AGAR DIL: CPT

## 2020-01-16 LAB
BACTERIA UR CULT: ABNORMAL
SIGNIFICANT IND 70042: ABNORMAL
SITE SITE: ABNORMAL
SOURCE SOURCE: ABNORMAL

## 2020-02-08 ENCOUNTER — HOSPITAL ENCOUNTER (INPATIENT)
Facility: MEDICAL CENTER | Age: 85
LOS: 5 days | DRG: 481 | End: 2020-02-13
Attending: EMERGENCY MEDICINE | Admitting: HOSPITALIST
Payer: MEDICARE

## 2020-02-08 ENCOUNTER — APPOINTMENT (OUTPATIENT)
Dept: RADIOLOGY | Facility: MEDICAL CENTER | Age: 85
DRG: 481 | End: 2020-02-08
Attending: ORTHOPAEDIC SURGERY
Payer: MEDICARE

## 2020-02-08 ENCOUNTER — APPOINTMENT (OUTPATIENT)
Dept: RADIOLOGY | Facility: MEDICAL CENTER | Age: 85
DRG: 481 | End: 2020-02-08
Attending: EMERGENCY MEDICINE
Payer: MEDICARE

## 2020-02-08 DIAGNOSIS — S09.90XA CLOSED HEAD INJURY, INITIAL ENCOUNTER: ICD-10-CM
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
## 2020-02-08 DIAGNOSIS — S72.002A CLOSED LEFT HIP FRACTURE, INITIAL ENCOUNTER (HCC): ICD-10-CM

## 2020-02-08 DIAGNOSIS — S72.92XA CLOSED FRACTURE OF LEFT FEMUR, UNSPECIFIED FRACTURE MORPHOLOGY, UNSPECIFIED PORTION OF FEMUR, INITIAL ENCOUNTER (HCC): ICD-10-CM

## 2020-02-08 PROBLEM — Z86.73 HISTORY OF STROKE: Status: ACTIVE | Noted: 2020-02-08

## 2020-02-08 PROBLEM — S72.90XA FEMUR FRACTURE (HCC): Status: ACTIVE | Noted: 2020-02-08

## 2020-02-08 LAB
25(OH)D3 SERPL-MCNC: 43 NG/ML (ref 30–100)
ANION GAP SERPL CALC-SCNC: 9 MMOL/L (ref 0–11.9)
BASOPHILS # BLD AUTO: 0.4 % (ref 0–1.8)
BASOPHILS # BLD: 0.04 K/UL (ref 0–0.12)
BUN SERPL-MCNC: 13 MG/DL (ref 8–22)
CALCIUM SERPL-MCNC: 9.2 MG/DL (ref 8.5–10.5)
CHLORIDE SERPL-SCNC: 102 MMOL/L (ref 96–112)
CO2 SERPL-SCNC: 30 MMOL/L (ref 20–33)
CREAT SERPL-MCNC: 0.68 MG/DL (ref 0.5–1.4)
EKG IMPRESSION: NORMAL
EOSINOPHIL # BLD AUTO: 0.2 K/UL (ref 0–0.51)
EOSINOPHIL NFR BLD: 2 % (ref 0–6.9)
ERYTHROCYTE [DISTWIDTH] IN BLOOD BY AUTOMATED COUNT: 46.5 FL (ref 35.9–50)
GLUCOSE SERPL-MCNC: 128 MG/DL (ref 65–99)
HCT VFR BLD AUTO: 44 % (ref 37–47)
HGB BLD-MCNC: 14.7 G/DL (ref 12–16)
IMM GRANULOCYTES # BLD AUTO: 0.07 K/UL (ref 0–0.11)
IMM GRANULOCYTES NFR BLD AUTO: 0.7 % (ref 0–0.9)
INR PPP: 1 (ref 0.87–1.13)
LYMPHOCYTES # BLD AUTO: 3.15 K/UL (ref 1–4.8)
LYMPHOCYTES NFR BLD: 31.9 % (ref 22–41)
MAGNESIUM SERPL-MCNC: 2.4 MG/DL (ref 1.5–2.5)
MCH RBC QN AUTO: 31.5 PG (ref 27–33)
MCHC RBC AUTO-ENTMCNC: 33.4 G/DL (ref 33.6–35)
MCV RBC AUTO: 94.2 FL (ref 81.4–97.8)
MONOCYTES # BLD AUTO: 0.42 K/UL (ref 0–0.85)
MONOCYTES NFR BLD AUTO: 4.3 % (ref 0–13.4)
NEUTROPHILS # BLD AUTO: 5.98 K/UL (ref 2–7.15)
NEUTROPHILS NFR BLD: 60.7 % (ref 44–72)
NRBC # BLD AUTO: 0 K/UL
NRBC BLD-RTO: 0 /100 WBC
PLATELET # BLD AUTO: 280 K/UL (ref 164–446)
PMV BLD AUTO: 9.2 FL (ref 9–12.9)
POTASSIUM SERPL-SCNC: 4 MMOL/L (ref 3.6–5.5)
PROTHROMBIN TIME: 13.4 SEC (ref 12–14.6)
RBC # BLD AUTO: 4.67 M/UL (ref 4.2–5.4)
SODIUM SERPL-SCNC: 141 MMOL/L (ref 135–145)
WBC # BLD AUTO: 9.9 K/UL (ref 4.8–10.8)

## 2020-02-08 PROCEDURE — 99222 1ST HOSP IP/OBS MODERATE 55: CPT | Mod: AI | Performed by: HOSPITALIST

## 2020-02-08 PROCEDURE — 73502 X-RAY EXAM HIP UNI 2-3 VIEWS: CPT | Mod: LT

## 2020-02-08 PROCEDURE — A9270 NON-COVERED ITEM OR SERVICE: HCPCS | Performed by: HOSPITALIST

## 2020-02-08 PROCEDURE — 700102 HCHG RX REV CODE 250 W/ 637 OVERRIDE(OP): Performed by: HOSPITALIST

## 2020-02-08 PROCEDURE — 73552 X-RAY EXAM OF FEMUR 2/>: CPT | Mod: LT

## 2020-02-08 PROCEDURE — 71045 X-RAY EXAM CHEST 1 VIEW: CPT

## 2020-02-08 PROCEDURE — 770006 HCHG ROOM/CARE - MED/SURG/GYN SEMI*

## 2020-02-08 PROCEDURE — 304561 HCHG STAT O2

## 2020-02-08 PROCEDURE — 99285 EMERGENCY DEPT VISIT HI MDM: CPT

## 2020-02-08 PROCEDURE — 80048 BASIC METABOLIC PNL TOTAL CA: CPT

## 2020-02-08 PROCEDURE — 36415 COLL VENOUS BLD VENIPUNCTURE: CPT

## 2020-02-08 PROCEDURE — 93005 ELECTROCARDIOGRAM TRACING: CPT | Performed by: HOSPITALIST

## 2020-02-08 PROCEDURE — 700105 HCHG RX REV CODE 258: Performed by: HOSPITALIST

## 2020-02-08 PROCEDURE — 70450 CT HEAD/BRAIN W/O DYE: CPT

## 2020-02-08 PROCEDURE — 85610 PROTHROMBIN TIME: CPT

## 2020-02-08 PROCEDURE — 85025 COMPLETE CBC W/AUTO DIFF WBC: CPT

## 2020-02-08 PROCEDURE — 82306 VITAMIN D 25 HYDROXY: CPT

## 2020-02-08 PROCEDURE — 83735 ASSAY OF MAGNESIUM: CPT

## 2020-02-08 PROCEDURE — 93010 ELECTROCARDIOGRAM REPORT: CPT | Performed by: INTERNAL MEDICINE

## 2020-02-08 RX ORDER — POLYETHYLENE GLYCOL 3350 17 G/17G
1 POWDER, FOR SOLUTION ORAL DAILY
Status: DISCONTINUED | OUTPATIENT
Start: 2020-02-08 | End: 2020-02-13 | Stop reason: HOSPADM

## 2020-02-08 RX ORDER — ECHINACEA PURPUREA EXTRACT 125 MG
1 TABLET ORAL 2 TIMES DAILY PRN
Status: DISCONTINUED | OUTPATIENT
Start: 2020-02-08 | End: 2020-02-13 | Stop reason: HOSPADM

## 2020-02-08 RX ORDER — OXYBUTYNIN CHLORIDE 10 MG/1
10 TABLET, EXTENDED RELEASE ORAL DAILY
COMMUNITY
Start: 2020-02-13

## 2020-02-08 RX ORDER — LETROZOLE 2.5 MG/1
2.5 TABLET, FILM COATED ORAL DAILY
Status: DISCONTINUED | OUTPATIENT
Start: 2020-02-09 | End: 2020-02-08

## 2020-02-08 RX ORDER — BISACODYL 10 MG
10 SUPPOSITORY, RECTAL RECTAL
Status: DISCONTINUED | OUTPATIENT
Start: 2020-02-08 | End: 2020-02-13 | Stop reason: HOSPADM

## 2020-02-08 RX ORDER — AMOXICILLIN 250 MG
2 CAPSULE ORAL 2 TIMES DAILY
Status: DISCONTINUED | OUTPATIENT
Start: 2020-02-08 | End: 2020-02-13 | Stop reason: HOSPADM

## 2020-02-08 RX ORDER — TRAMADOL HYDROCHLORIDE 50 MG/1
50 TABLET ORAL EVERY 6 HOURS PRN
Status: DISCONTINUED | OUTPATIENT
Start: 2020-02-08 | End: 2020-02-13 | Stop reason: HOSPADM

## 2020-02-08 RX ORDER — LETROZOLE 2.5 MG/1
2.5 TABLET, FILM COATED ORAL DAILY
COMMUNITY
Start: 2020-02-13

## 2020-02-08 RX ORDER — CARBOXYMETHYLCELLULOSE SODIUM 5 MG/ML
1 SOLUTION/ DROPS OPHTHALMIC 3 TIMES DAILY PRN
Status: DISCONTINUED | OUTPATIENT
Start: 2020-02-08 | End: 2020-02-13 | Stop reason: HOSPADM

## 2020-02-08 RX ORDER — MOMETASONE FUROATE 1 MG/ML
1 SOLUTION TOPICAL 2 TIMES DAILY PRN
Status: DISCONTINUED | OUTPATIENT
Start: 2020-02-08 | End: 2020-02-08

## 2020-02-08 RX ORDER — OXYBUTYNIN CHLORIDE 10 MG/1
10 TABLET, EXTENDED RELEASE ORAL DAILY
Status: DISCONTINUED | OUTPATIENT
Start: 2020-02-09 | End: 2020-02-13 | Stop reason: HOSPADM

## 2020-02-08 RX ORDER — GUAIFENESIN/DEXTROMETHORPHAN 100-10MG/5
10 SYRUP ORAL EVERY 6 HOURS PRN
Status: DISCONTINUED | OUTPATIENT
Start: 2020-02-08 | End: 2020-02-13 | Stop reason: HOSPADM

## 2020-02-08 RX ORDER — ONDANSETRON 2 MG/ML
4 INJECTION INTRAMUSCULAR; INTRAVENOUS EVERY 4 HOURS PRN
Status: DISCONTINUED | OUTPATIENT
Start: 2020-02-08 | End: 2020-02-13 | Stop reason: HOSPADM

## 2020-02-08 RX ORDER — BISACODYL 5 MG
10 TABLET, DELAYED RELEASE (ENTERIC COATED) ORAL
COMMUNITY
End: 2020-02-13 | Stop reason: ALTCHOICE

## 2020-02-08 RX ORDER — ONDANSETRON 4 MG/1
4 TABLET, ORALLY DISINTEGRATING ORAL EVERY 4 HOURS PRN
Status: DISCONTINUED | OUTPATIENT
Start: 2020-02-08 | End: 2020-02-13 | Stop reason: HOSPADM

## 2020-02-08 RX ORDER — SIMVASTATIN 40 MG
40 TABLET ORAL
Status: DISCONTINUED | OUTPATIENT
Start: 2020-02-08 | End: 2020-02-13 | Stop reason: HOSPADM

## 2020-02-08 RX ORDER — SODIUM CHLORIDE 9 MG/ML
INJECTION, SOLUTION INTRAVENOUS CONTINUOUS
Status: DISCONTINUED | OUTPATIENT
Start: 2020-02-08 | End: 2020-02-10

## 2020-02-08 RX ORDER — ECHINACEA PURPUREA EXTRACT 125 MG
1 TABLET ORAL 2 TIMES DAILY PRN
COMMUNITY
Start: 2020-02-13

## 2020-02-08 RX ORDER — MORPHINE SULFATE 4 MG/ML
2 INJECTION, SOLUTION INTRAMUSCULAR; INTRAVENOUS
Status: DISCONTINUED | OUTPATIENT
Start: 2020-02-08 | End: 2020-02-09

## 2020-02-08 RX ORDER — MOMETASONE FUROATE 1 MG/ML
1 SOLUTION TOPICAL 2 TIMES DAILY PRN
COMMUNITY
Start: 2020-02-13

## 2020-02-08 RX ORDER — MAGNESIUM OXIDE 400 MG/1
400 TABLET ORAL EVERY EVENING
Status: DISCONTINUED | OUTPATIENT
Start: 2020-02-08 | End: 2020-02-08

## 2020-02-08 RX ORDER — PREGABALIN 50 MG/1
50 CAPSULE ORAL
COMMUNITY
Start: 2020-02-13

## 2020-02-08 RX ORDER — PREGABALIN 25 MG/1
50 CAPSULE ORAL
Status: DISCONTINUED | OUTPATIENT
Start: 2020-02-08 | End: 2020-02-13 | Stop reason: HOSPADM

## 2020-02-08 RX ORDER — ACETAMINOPHEN 325 MG/1
650 TABLET ORAL EVERY 6 HOURS PRN
Status: DISCONTINUED | OUTPATIENT
Start: 2020-02-08 | End: 2020-02-13 | Stop reason: HOSPADM

## 2020-02-08 RX ADMIN — PREGABALIN 50 MG: 25 CAPSULE ORAL at 20:28

## 2020-02-08 RX ADMIN — SODIUM CHLORIDE: 9 INJECTION, SOLUTION INTRAVENOUS at 15:48

## 2020-02-08 RX ADMIN — SIMVASTATIN 40 MG: 40 TABLET, FILM COATED ORAL at 20:29

## 2020-02-08 RX ADMIN — SENNOSIDES AND DOCUSATE SODIUM 2 TABLET: 8.6; 5 TABLET ORAL at 15:48

## 2020-02-08 RX ADMIN — ACETAMINOPHEN 650 MG: 325 TABLET, FILM COATED ORAL at 19:06

## 2020-02-08 RX ADMIN — POLYETHYLENE GLYCOL 3350 1 PACKET: 17 POWDER, FOR SOLUTION ORAL at 15:47

## 2020-02-08 ASSESSMENT — ENCOUNTER SYMPTOMS
VOMITING: 0
HEADACHES: 0
BLURRED VISION: 0
BRUISES/BLEEDS EASILY: 0
CHILLS: 0
NAUSEA: 0
DOUBLE VISION: 0
MYALGIAS: 0
WHEEZING: 0
COUGH: 0
DIZZINESS: 0
SINUS PAIN: 0
PALPITATIONS: 0
FEVER: 0
ABDOMINAL PAIN: 0
HEARTBURN: 0
DEPRESSION: 0
HEMOPTYSIS: 0
BACK PAIN: 0
CLAUDICATION: 0
PHOTOPHOBIA: 0
PND: 0

## 2020-02-08 ASSESSMENT — LIFESTYLE VARIABLES
ON A TYPICAL DAY WHEN YOU DRINK ALCOHOL HOW MANY DRINKS DO YOU HAVE: 0
TOTAL SCORE: 0
HAVE YOU EVER FELT YOU SHOULD CUT DOWN ON YOUR DRINKING: NO
EVER_SMOKED: NEVER
HOW MANY TIMES IN THE PAST YEAR HAVE YOU HAD 5 OR MORE DRINKS IN A DAY: 0
EVER FELT BAD OR GUILTY ABOUT YOUR DRINKING: NO
EVER HAD A DRINK FIRST THING IN THE MORNING TO STEADY YOUR NERVES TO GET RID OF A HANGOVER: NO
CONSUMPTION TOTAL: NEGATIVE
TOTAL SCORE: 0
TOTAL SCORE: 0
EVER_SMOKED: NEVER
DOES PATIENT WANT TO STOP DRINKING: NO
DO YOU DRINK ALCOHOL: NO
AVERAGE NUMBER OF DAYS PER WEEK YOU HAVE A DRINK CONTAINING ALCOHOL: 0
HAVE PEOPLE ANNOYED YOU BY CRITICIZING YOUR DRINKING: NO

## 2020-02-08 ASSESSMENT — COGNITIVE AND FUNCTIONAL STATUS - GENERAL
CLIMB 3 TO 5 STEPS WITH RAILING: A LOT
DRESSING REGULAR LOWER BODY CLOTHING: A LOT
PERSONAL GROOMING: A LITTLE
HELP NEEDED FOR BATHING: A LOT
DRESSING REGULAR UPPER BODY CLOTHING: A LOT
TURNING FROM BACK TO SIDE WHILE IN FLAT BAD: A LITTLE
STANDING UP FROM CHAIR USING ARMS: A LOT
MOVING FROM LYING ON BACK TO SITTING ON SIDE OF FLAT BED: A LOT
SUGGESTED CMS G CODE MODIFIER DAILY ACTIVITY: CK
DAILY ACTIVITIY SCORE: 14
MOVING TO AND FROM BED TO CHAIR: A LOT
WALKING IN HOSPITAL ROOM: A LOT
EATING MEALS: A LITTLE
TOILETING: A LOT
MOBILITY SCORE: 13
SUGGESTED CMS G CODE MODIFIER MOBILITY: CL

## 2020-02-08 ASSESSMENT — COPD QUESTIONNAIRES
COPD SCREENING SCORE: 3
DURING THE PAST 4 WEEKS HOW MUCH DID YOU FEEL SHORT OF BREATH: NONE/LITTLE OF THE TIME
DO YOU EVER COUGH UP ANY MUCUS OR PHLEGM?: YES, A FEW DAYS A WEEK OR MONTH
HAVE YOU SMOKED AT LEAST 100 CIGARETTES IN YOUR ENTIRE LIFE: NO/DON'T KNOW

## 2020-02-08 NOTE — H&P
Hospital Medicine History & Physical Note    Date of Service  2/8/2020    Primary Care Physician  Denver J Miller, M.D.    Consultants  Orthopedic surgery    Code Status  Full code    Chief Complaint  Fall with left sided femur fracture    History of Presenting Illness  89 y.o. female who presented 2/8/2020 with past medical history of stroke with left-sided deficit, is coming today from nursing facility after a mechanical fall as per patient she was trying to get out of bed with the help of the nursing staff, patient has left-sided weakness after stroke she is bedbound/wheelchair bound, patient denies any chest pain shortness of breath palpitation dizziness lightheadedness new focal weakness before her fall, patient stated that she hit her head occipital area left side but there is no open wounds or bruises, patient is alert oriented follows commands she denies any loss of consciousness, patient is on Plavix patient has CT head did not show acute findings no bleeding, patient denies any fever chills coughing sweating lymphadenopathy, patient feels mild short of breath after the fall, patient was brought to the emergency room initial evaluation showed left-sided femur fracture, chest x-ray showed possible edema/atelectasis, orthopedic surgery was consulted and plan is for surgical repair, patient will be admitted to Ortho floor, continue pain management, continue close monitoring, I have discussed plan of care with patient, all questions have been answered.  Patient's Plavix is going to be held for surgery and will be restarted when okay with orthopedic surgery          Review of Systems  Review of Systems   Constitutional: Negative for chills and fever.   HENT: Negative for congestion, nosebleeds and sinus pain.    Eyes: Negative for blurred vision, double vision and photophobia.   Respiratory: Negative for cough, hemoptysis and wheezing.    Cardiovascular: Negative for chest pain, palpitations, claudication, leg  swelling and PND.   Gastrointestinal: Negative for abdominal pain, heartburn, nausea and vomiting.   Genitourinary: Negative for dysuria, hematuria and urgency.   Musculoskeletal: Positive for joint pain (Left hip). Negative for back pain and myalgias.   Skin: Negative for rash.   Neurological: Negative for dizziness and headaches.   Endo/Heme/Allergies: Does not bruise/bleed easily.   Psychiatric/Behavioral: Negative for depression and suicidal ideas.       Past Medical History   has a past medical history of Arthritis, Cancer ([facility]) (), CATARACT, Snoring, Stroke ([facility]) (), Unspecified urinary incontinence, and Urinary bladder disorder.    Surgical History   has a past surgical history that includes carotid endarterectomy; cataract extraction with iol; and carotid endarterectomy (2013).     Family History  Family history is unknown by patient.     Social History   reports that she has never smoked. She has never used smokeless tobacco. She reports that she does not drink alcohol or use drugs.    Allergies  No Known Allergies    Medications  Prior to Admission Medications   Prescriptions Last Dose Informant Patient Reported? Taking?   Calcium-Vitamin D-Vitamin K (VIACTIV) 500-500-40 MG-UNT-MCG Chew Tab 2020 at 1800 MAR from Other Facility Yes No   Sig: Take 1 Tab by mouth every evening.   Cranberry 250 MG Cap 2020 at 1000 MAR from Other Facility Yes No   Sig: Take 250 mg by mouth 2 Times a Day.   DM-Phenylephrine-Acetaminophen (VICKS DAYQUIL COLD & FLU) 10-5-325 MG Cap prn at prn MAR from Other Facility Yes Yes   Sig: Take 1 Cap by mouth every 6 hours as needed (cold/flu).   Glycerin-Hypromellose- (ARTIFICIAL TEARS) 0.2-0.2-1 % Solution 2020 at 0800 MAR from Other Facility Yes Yes   Sig: Place 1 Drop in both eyes 3 times a day.   Liniments (SALONPAS PAIN RELIEF PATCH EX) prn at prn MAR from Other Facility Yes Yes   Si Patch by Apply externally route every morning as needed (pain).    Yeyxky-GnZfi-GkTbrf-FA-Omega (MULTIVITAMIN/MINERALS PO) 2/8/2020 at 1000 MAR from Other Facility Yes Yes   Sig: Take 1 Tab by mouth every day.   Probiotic Product (PROBIOTIC PEARLS PO) 2/8/2020 at 1000 MAR from Other Facility Yes No   Sig: Take 1 Cap by mouth every day.   acetaminophen (TYLENOL) 500 MG Tab prn at prn MAR from Other Facility Yes No   Sig: Take 500 mg by mouth every four hours as needed (generlized pain).   artificial tears 1.4 % Solution prn at prn MAR from Other Facility Yes No   Sig: Place 1 Drop in both eyes 3 times a day as needed (burning eyes).   bisacodyl EC (DULCOLAX) 5 MG Tablet Delayed Response prn at prn MAR from Other Facility Yes Yes   Sig: Take 10 mg by mouth 1 time daily as needed for Constipation.   clopidogrel (PLAVIX) 75 MG Tab 2/8/2020 at 1000 MAR from Other Facility Yes No   Sig: Take 75 mg by mouth every morning.   letrozole (FEMARA) 2.5 MG Tab 2/8/2020 at 1000 MAR from Other Facility Yes Yes   Sig: Take 2.5 mg by mouth every day.   magnesium oxide (MAG-OX) 400 MG Tab 2/8/2020 at 1000 MAR from Other Facility Yes No   Sig: Take 400 mg by mouth every evening.   mometasone (ELOCON) 0.1 % lotion prn at prn MAR from Other Facility Yes Yes   Sig: Apply 1 Drop to affected area(s) 2 times a day as needed (irritation/discomfort).   oxybutynin SR (DITROPAN-XL) 10 MG CR tablet 2/8/2020 at 1000 MAR from Other Facility Yes Yes   Sig: Take 10 mg by mouth every day.   pregabalin (LYRICA) 50 MG capsule 2/7/2020 at 2000 MAR from Other Facility Yes Yes   Sig: Take 50 mg by mouth every bedtime.   simvastatin (ZOCOR) 40 MG Tab 2/7/2020 at 2000 MAR from Other Facility Yes No   Sig: Take 40 mg by mouth every bedtime.   sodium chloride (OCEAN) 0.65 % Solution prn at prn MAR from Other Facility Yes Yes   Sig: Spray 1 Spray in nose 2 times a day as needed for Congestion.   tramadol (ULTRAM) 50 MG Tab prn at prn MAR from Other Facility Yes No   Sig: Take 50 mg by mouth every 6 hours as needed for  Moderate Pain.      Facility-Administered Medications: None       Physical Exam  Temp:  [36.3 °C (97.3 °F)] 36.3 °C (97.3 °F)  Pulse:  [54-64] 54  Resp:  [16] 16  BP: (133-154)/(47-76) 133/47  SpO2:  [90 %-98 %] 98 %    Physical Exam  Vitals signs and nursing note reviewed.   Constitutional:       Appearance: Normal appearance.   HENT:      Head: Normocephalic.      Nose: Nose normal.   Eyes:      General: No scleral icterus.        Right eye: No discharge.         Left eye: No discharge.      Extraocular Movements: Extraocular movements intact.      Conjunctiva/sclera: Conjunctivae normal.      Pupils: Pupils are equal, round, and reactive to light.   Neck:      Musculoskeletal: Normal range of motion and neck supple. No neck rigidity or muscular tenderness.   Cardiovascular:      Rate and Rhythm: Normal rate and regular rhythm.      Pulses: Normal pulses.      Heart sounds: Normal heart sounds.   Pulmonary:      Effort: Pulmonary effort is normal. No respiratory distress.      Breath sounds: Rales present. No wheezing.   Abdominal:      General: Bowel sounds are normal. There is no distension.      Palpations: Abdomen is soft.      Tenderness: There is no tenderness. There is no guarding.      Hernia: No hernia is present.   Musculoskeletal: Normal range of motion.         General: Deformity (Left hip) and signs of injury (Left hip) present.   Skin:     General: Skin is warm and dry.      Capillary Refill: Capillary refill takes less than 2 seconds.   Neurological:      Mental Status: She is alert and oriented to person, place, and time.      Motor: Weakness (Left-sided weakness) present.   Psychiatric:         Mood and Affect: Mood normal.         Behavior: Behavior normal.         Laboratory:  Recent Labs     02/08/20  1030   WBC 9.9   RBC 4.67   HEMOGLOBIN 14.7   HEMATOCRIT 44.0   MCV 94.2   MCH 31.5   MCHC 33.4*   RDW 46.5   PLATELETCT 280   MPV 9.2     Recent Labs     02/08/20  1030   SODIUM 141   POTASSIUM  4.0   CHLORIDE 102   CO2 30   GLUCOSE 128*   BUN 13   CREATININE 0.68   CALCIUM 9.2     Recent Labs     02/08/20  1030   GLUCOSE 128*     Recent Labs     02/08/20  1030   INR 1.00     No results for input(s): NTPROBNP in the last 72 hours.      No results for input(s): TROPONINT in the last 72 hours.    Urinalysis:    No results found     Imaging:  DX-FEMUR-2+ LEFT   Final Result      Acute, obliquely oriented proximal femoral fracture, with subtrochanteric and intertrochanteric components.      DX-HIP-COMPLETE - UNILATERAL 2+ LEFT   Final Result      1.  Findings indicate comminuted displaced and angulated acute fracture of the left proximal femur.      2.  Bones appear osteopenic.      DX-CHEST-PORTABLE (1 VIEW)   Final Result         1.  There is some diffuse increased prominence of interstitial opacifications including the perihilar regions of each lung which could indicate pulmonary edema or inflammation.      2.  No consolidations identified.      CT-HEAD W/O   Final Result         1.  NO ACUTE ABNORMALITIES ARE NOTED ON CT SCAN OF THE HEAD.      2.  Extensive right-sided encephalomalacia likely due to prior infarct.      Findings are consistent with atrophy.  Decreased attenuation in the periventricular white matter likely indicates microvascular ischemic disease.            Assessment/Plan:  I anticipate this patient will require at least two midnights for appropriate medical management, necessitating inpatient admission.    * Femur fracture (HCC)- (present on admission)  Assessment & Plan  Left-sided, Ortho consulted, continue pain medication, holding Plavix for now due to surgery, will restart when okay with orthopedic surgery.    History of stroke- (present on admission)  Assessment & Plan  Left-sided weakness, will hold Plavix for surgery.  Will restart when okay with orthopedic surgery    Spinal stenosis of lumbar region- (present on admission)  Assessment & Plan  Stable, continue pain  management.    Fall- (present on admission)  Assessment & Plan  Mechanical fall causing left-sided femur fracture.      VTE prophylaxis: SCDs

## 2020-02-08 NOTE — ED TRIAGE NOTES
89 y/o female bib ambulance from [facility] after a fall this morning causing her to strike the back of her head, left ribs, and left hip. Pt c/o pain to the left hip and is tender with palp of left ribs, she denies loc, she is on plavix. Left sided deficits from previous CVA. Pt taken to CT, arrived to room 13, report and care of pt to LATISHA Alcala.

## 2020-02-08 NOTE — ED NOTES
Pharmacy Medication Reconciliation      Medication reconciliation updated and complete per MAR:[facility]  Allergies have been verified per MAR  No oral ABX within the last 14 days

## 2020-02-08 NOTE — ED NOTES
Ortho surgeon asking to delay transport to floor for 15 minutes. Needs an additional set of xrays.

## 2020-02-08 NOTE — CONSULTS
ORTHOPEDIC SURGERY CONSULT NOTE    CC: left hip fracture    HPI:  Pau Escalera is a 89 y.o. female who presents today for left hip fracture. She fell while getting out of bed today. She had pain in her left hip and presented to the ER for further evaluation. Imaging demonstrates hip fracture. She states her hip is not painful right now and just feels numb.     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 She has not walked in over 2 months. She transfers from a bed to a chair. She has lived in a facility for over 6 years. She states her daughter makes her medical decisions and she lives in [place]    Review of systems was completed and is negative except per HPI    Past Medical History:   Diagnosis Date   • Arthritis    • Cancer (HCC) 2013    melanoma  right leg  surgery pending   • CATARACT     ebony IOL   • Snoring    • Stroke (HCC) 2006    left sided paralysis   • Unspecified urinary incontinence    • Urinary bladder disorder    Breast cancer - treated medically     Past Surgical History:   Procedure Laterality Date   • CAROTID ENDARTERECTOMY  12/13/2013    Performed by Boone Mauro M.D. at SURGERY [facility] ORS   • CAROTID ENDARTERECTOMY     • CATARACT EXTRACTION WITH IOL      ebony eyes       Social History:   She lives in a facility  No current tobacco use     Family History   Family history unknown: Yes          PHYSICAL EXAM  Vitals:    02/08/20 1201   BP: 133/47   Pulse: (!) 54   Resp:    Temp:    SpO2: 98%     General: No acute distress. Non toxic appearing  Neuro: follows commands  Resp: unlabored breathing on room air  Cv: extremities are warm and well perfused  Abd: non distended  MSK: Left lower extremity examined. She has no motor function after stroke. She does endorse intact sensation     RESULTS REVIEWED  Hip films demonstrate left intertrochanteric fracture     ASSESSMENT AND PLAN  89 y.o.female with left intertrochanteric hip fracture.    The patient states she  does not wish to make the decision about surgery. She would like her daughter to make this decision. I will reach out to her daughter today and discuss surgery. We will discuss with family and medically optimize the patient. Due to this, likely plan for OR tomorrow 2/9.     1. Admit to hospitalist  2. Please obtain entire femur films   3. Medically optimize for possible surgery tomorrow   4. Hold plavix  5. NPO midnight for possible OR tomorrow. May have a diet now from an orthopedic perspective     Bhavana Ivory M.D.

## 2020-02-08 NOTE — ED NOTES
Pt to [facility] from [facility] via [facility]. Report from LATISHA Albarado. Assumed care.

## 2020-02-08 NOTE — ED PROVIDER NOTES
ED Provider Note    CHIEF COMPLAINT  Chief Complaint   Patient presents with   • T-5000 FALL   • Hip Pain   • Rib Pain       HPI  Pau Escalera is a 89 y.o. female who presents complaining of head trauma, left hip pain, and left chest wall pain.    Per EMS, the patient sustained a ground-level fall, striking the back of her head at her extended care facility today.  Patient is on Plavix for prior CVA which left her with a left hemiplegia.    No loss of consciousness was reported.  She admits to left rib pain and left hip numbness.  She denies headache, neck pain, vomiting, new weakness or numbness.      ALLERGIES  No Known Allergies    CURRENT MEDICATIONS  Home Medications    **Home medications have not yet been reviewed for this encounter**         PAST MEDICAL HISTORY   has a past medical history of Arthritis, Cancer ([facility]) (2013), CATARACT, Snoring, Stroke ([facility]) (2006), Unspecified urinary incontinence, and Urinary bladder disorder.    SURGICAL HISTORY   has a past surgical history that includes carotid endarterectomy; cataract extraction with iol; and carotid endarterectomy (12/13/2013).    SOCIAL HISTORY  Social History     Tobacco Use   • Smoking status: Never Smoker   • Smokeless tobacco: Never Used   Substance and Sexual Activity   • Alcohol use: No   • Drug use: No   • Sexual activity: Not on file       REVIEW OF SYSTEMS  See HPI for further details.  All other systems are negative except as above in HPI.      PHYSICAL EXAM  VITAL SIGNS: /76   Pulse (!) 59   Temp 36.3 °C (97.3 °F) (Temporal)   Resp 16   Wt 54.4 kg (120 lb)   SpO2 96%   BMI 23.44 kg/m²     General:  WDWN, nontoxic appearing in NAD; A+Ox3; V/S as above  Skin: warm and dry; good color; no rash  HEENT: NCAT; no hematoma or bony depression; EOMs intact; no raccoon eyes or kwok signs  Neck: FROM; no tenderness or step-offs  Cardiovascular: Regular heart rate and rhythm.  No murmurs, rubs, or gallops; pulses 2+ bilaterally radially  and DP areas  Lungs: Clear to auscultation with good air movement bilaterally.  No wheezes, rhonchi, or rales.   Abdomen: BS present; soft; NTND; no rebound, guarding, or rigidity.  No organomegaly or pulsatile mass   Extremities: Contracted left hand; brace present on the left lower extremity; no obvious shortening or rotation  Neurologic: Left facial droop noted; speech slightly slurred  Psychiatric: Appropriate affect, normal mood    LABS  pending      IMAGING  DX-HIP-COMPLETE - UNILATERAL 2+ LEFT   Final Result      1.  Findings indicate comminuted displaced and angulated acute fracture of the left proximal femur.      2.  Bones appear osteopenic.      DX-CHEST-PORTABLE (1 VIEW)   Final Result         1.  There is some diffuse increased prominence of interstitial opacifications including the perihilar regions of each lung which could indicate pulmonary edema or inflammation.      2.  No consolidations identified.      CT-HEAD W/O   Final Result         1.  NO ACUTE ABNORMALITIES ARE NOTED ON CT SCAN OF THE HEAD.      2.  Extensive right-sided encephalomalacia likely due to prior infarct.      Findings are consistent with atrophy.  Decreased attenuation in the periventricular white matter likely indicates microvascular ischemic disease.          MEDICAL RECORD  I have reviewed patient's medical record and pertinent results are listed below.      COURSE & MEDICAL DECISION MAKING  I have reviewed any medical record information, laboratory studies and radiographic results as noted.    Pau Escalera is a 89 y.o. female who presents complaining of a fall at her living facility today.  Patient hit her head, landed on her left hip and complains of left chest wall pain.  Patient is alert, following commands, and neurovascularly intact.  Patient is on Plavix.  Patient was a TBI protocol patient and was seen upon arrival.  CT head, chest x-ray, and left hip x-ray was ordered.     Pt re-evaluated and advised of results  demonstrating a femoral fracture.  Pt states she has been NPO since dinner last night.    11:41 AM  I discussed the case with Dr. Ivory from orthopedics who will see the patient.  Paging the hospitalist.    11:46 AM  Discussed with Dr. Lujan from the hospitalist service who agrees to admit the patient.    FINAL IMPRESSION  1. Fall, initial encounter     2. Closed left hip fracture, initial encounter ([facility])     3. Closed head injury, initial encounter       Electronically signed by: Joann Loyola M.D., 2/8/2020 10:27 AM

## 2020-02-09 ENCOUNTER — APPOINTMENT (OUTPATIENT)
Dept: RADIOLOGY | Facility: MEDICAL CENTER | Age: 85
DRG: 481 | End: 2020-02-09
Attending: ORTHOPAEDIC SURGERY
Payer: MEDICARE

## 2020-02-09 ENCOUNTER — ANESTHESIA (OUTPATIENT)
Dept: SURGERY | Facility: MEDICAL CENTER | Age: 85
DRG: 481 | End: 2020-02-09
Payer: MEDICARE

## 2020-02-09 ENCOUNTER — ANESTHESIA EVENT (OUTPATIENT)
Dept: SURGERY | Facility: MEDICAL CENTER | Age: 85
DRG: 481 | End: 2020-02-09
Payer: MEDICARE

## 2020-02-09 LAB
ALBUMIN SERPL BCP-MCNC: 3.7 G/DL (ref 3.2–4.9)
ALBUMIN/GLOB SERPL: 1.7 G/DL
ALP SERPL-CCNC: 53 U/L (ref 30–99)
ALT SERPL-CCNC: 11 U/L (ref 2–50)
ANION GAP SERPL CALC-SCNC: 8 MMOL/L (ref 0–11.9)
AST SERPL-CCNC: 19 U/L (ref 12–45)
BILIRUB SERPL-MCNC: 0.9 MG/DL (ref 0.1–1.5)
BUN SERPL-MCNC: 18 MG/DL (ref 8–22)
CALCIUM SERPL-MCNC: 8.8 MG/DL (ref 8.5–10.5)
CHLORIDE SERPL-SCNC: 106 MMOL/L (ref 96–112)
CHOLEST SERPL-MCNC: 126 MG/DL (ref 100–199)
CO2 SERPL-SCNC: 25 MMOL/L (ref 20–33)
CREAT SERPL-MCNC: 0.59 MG/DL (ref 0.5–1.4)
ERYTHROCYTE [DISTWIDTH] IN BLOOD BY AUTOMATED COUNT: 47.9 FL (ref 35.9–50)
GLOBULIN SER CALC-MCNC: 2.2 G/DL (ref 1.9–3.5)
GLUCOSE SERPL-MCNC: 138 MG/DL (ref 65–99)
HCT VFR BLD AUTO: 37 % (ref 37–47)
HDLC SERPL-MCNC: 50 MG/DL
HGB BLD-MCNC: 11.8 G/DL (ref 12–16)
LDLC SERPL CALC-MCNC: 63 MG/DL
MCH RBC QN AUTO: 30.8 PG (ref 27–33)
MCHC RBC AUTO-ENTMCNC: 31.9 G/DL (ref 33.6–35)
MCV RBC AUTO: 96.6 FL (ref 81.4–97.8)
PLATELET # BLD AUTO: 205 K/UL (ref 164–446)
PMV BLD AUTO: 9.1 FL (ref 9–12.9)
POTASSIUM SERPL-SCNC: 4.1 MMOL/L (ref 3.6–5.5)
PROT SERPL-MCNC: 5.9 G/DL (ref 6–8.2)
RBC # BLD AUTO: 3.83 M/UL (ref 4.2–5.4)
SODIUM SERPL-SCNC: 139 MMOL/L (ref 135–145)
TRIGL SERPL-MCNC: 63 MG/DL (ref 0–149)
WBC # BLD AUTO: 16.2 K/UL (ref 4.8–10.8)

## 2020-02-09 PROCEDURE — 36415 COLL VENOUS BLD VENIPUNCTURE: CPT

## 2020-02-09 PROCEDURE — A6222 GAUZE <=16 IN NO W/SAL W/O B: HCPCS | Performed by: ORTHOPAEDIC SURGERY

## 2020-02-09 PROCEDURE — 501838 HCHG SUTURE GENERAL: Performed by: ORTHOPAEDIC SURGERY

## 2020-02-09 PROCEDURE — A6402 STERILE GAUZE <= 16 SQ IN: HCPCS | Performed by: ORTHOPAEDIC SURGERY

## 2020-02-09 PROCEDURE — 700105 HCHG RX REV CODE 258: Performed by: ANESTHESIOLOGY

## 2020-02-09 PROCEDURE — A6454 SELF-ADHER BAND W>=3" <5"/YD: HCPCS | Performed by: ORTHOPAEDIC SURGERY

## 2020-02-09 PROCEDURE — 80053 COMPREHEN METABOLIC PANEL: CPT

## 2020-02-09 PROCEDURE — 160041 HCHG SURGERY MINUTES - EA ADDL 1 MIN LEVEL 4: Performed by: ORTHOPAEDIC SURGERY

## 2020-02-09 PROCEDURE — 700102 HCHG RX REV CODE 250 W/ 637 OVERRIDE(OP): Performed by: ANESTHESIOLOGY

## 2020-02-09 PROCEDURE — 160009 HCHG ANES TIME/MIN: Performed by: ORTHOPAEDIC SURGERY

## 2020-02-09 PROCEDURE — 770006 HCHG ROOM/CARE - MED/SURG/GYN SEMI*

## 2020-02-09 PROCEDURE — A9270 NON-COVERED ITEM OR SERVICE: HCPCS | Performed by: ANESTHESIOLOGY

## 2020-02-09 PROCEDURE — 700101 HCHG RX REV CODE 250: Performed by: ANESTHESIOLOGY

## 2020-02-09 PROCEDURE — 700105 HCHG RX REV CODE 258

## 2020-02-09 PROCEDURE — 73552 X-RAY EXAM OF FEMUR 2/>: CPT | Mod: LT

## 2020-02-09 PROCEDURE — 160035 HCHG PACU - 1ST 60 MINS PHASE I: Performed by: ORTHOPAEDIC SURGERY

## 2020-02-09 PROCEDURE — C1713 ANCHOR/SCREW BN/BN,TIS/BN: HCPCS | Performed by: ORTHOPAEDIC SURGERY

## 2020-02-09 PROCEDURE — 500891 HCHG PACK, ORTHO MAJOR: Performed by: ORTHOPAEDIC SURGERY

## 2020-02-09 PROCEDURE — 160002 HCHG RECOVERY MINUTES (STAT): Performed by: ORTHOPAEDIC SURGERY

## 2020-02-09 PROCEDURE — 700111 HCHG RX REV CODE 636 W/ 250 OVERRIDE (IP): Performed by: ORTHOPAEDIC SURGERY

## 2020-02-09 PROCEDURE — 99232 SBSQ HOSP IP/OBS MODERATE 35: CPT | Performed by: NURSE PRACTITIONER

## 2020-02-09 PROCEDURE — 160029 HCHG SURGERY MINUTES - 1ST 30 MINS LEVEL 4: Performed by: ORTHOPAEDIC SURGERY

## 2020-02-09 PROCEDURE — 110371 HCHG SHELL REV 272: Performed by: ORTHOPAEDIC SURGERY

## 2020-02-09 PROCEDURE — 80061 LIPID PANEL: CPT

## 2020-02-09 PROCEDURE — A9270 NON-COVERED ITEM OR SERVICE: HCPCS | Performed by: HOSPITALIST

## 2020-02-09 PROCEDURE — 0QS736Z REPOSITION LEFT UPPER FEMUR WITH INTRAMEDULLARY INTERNAL FIXATION DEVICE, PERCUTANEOUS APPROACH: ICD-10-PCS | Performed by: ORTHOPAEDIC SURGERY

## 2020-02-09 PROCEDURE — 700111 HCHG RX REV CODE 636 W/ 250 OVERRIDE (IP): Performed by: ANESTHESIOLOGY

## 2020-02-09 PROCEDURE — 160048 HCHG OR STATISTICAL LEVEL 1-5: Performed by: ORTHOPAEDIC SURGERY

## 2020-02-09 PROCEDURE — 51798 US URINE CAPACITY MEASURE: CPT

## 2020-02-09 PROCEDURE — 160036 HCHG PACU - EA ADDL 30 MINS PHASE I: Performed by: ORTHOPAEDIC SURGERY

## 2020-02-09 PROCEDURE — 501445 HCHG STAPLER, SKIN DISP: Performed by: ORTHOPAEDIC SURGERY

## 2020-02-09 PROCEDURE — 700102 HCHG RX REV CODE 250 W/ 637 OVERRIDE(OP): Performed by: HOSPITALIST

## 2020-02-09 PROCEDURE — 85027 COMPLETE CBC AUTOMATED: CPT

## 2020-02-09 DEVICE — SCREW CROSS LOCK 5MM X 45MM (4TX5=20): Type: IMPLANTABLE DEVICE | Site: HIP | Status: FUNCTIONAL

## 2020-02-09 DEVICE — SCREW CROSS LOCK 5MM X 37.5MM (4TX5=20): Type: IMPLANTABLE DEVICE | Site: HIP | Status: FUNCTIONAL

## 2020-02-09 DEVICE — IMPLANTABLE DEVICE: Type: IMPLANTABLE DEVICE | Site: HIP | Status: FUNCTIONAL

## 2020-02-09 DEVICE — SCREW LAG 10.5MM X 85MM (4TX2=8): Type: IMPLANTABLE DEVICE | Site: HIP | Status: FUNCTIONAL

## 2020-02-09 RX ORDER — LIDOCAINE HYDROCHLORIDE 20 MG/ML
INJECTION, SOLUTION EPIDURAL; INFILTRATION; INTRACAUDAL; PERINEURAL PRN
Status: DISCONTINUED | OUTPATIENT
Start: 2020-02-09 | End: 2020-02-09 | Stop reason: SURG

## 2020-02-09 RX ORDER — CEFAZOLIN SODIUM 1 G/50ML
1 INJECTION, SOLUTION INTRAVENOUS EVERY 8 HOURS
Status: COMPLETED | OUTPATIENT
Start: 2020-02-09 | End: 2020-02-10

## 2020-02-09 RX ORDER — MORPHINE SULFATE 4 MG/ML
1 INJECTION, SOLUTION INTRAMUSCULAR; INTRAVENOUS
Status: DISCONTINUED | OUTPATIENT
Start: 2020-02-09 | End: 2020-02-13 | Stop reason: HOSPADM

## 2020-02-09 RX ORDER — DIPHENHYDRAMINE HYDROCHLORIDE 50 MG/ML
12.5 INJECTION INTRAMUSCULAR; INTRAVENOUS
Status: DISCONTINUED | OUTPATIENT
Start: 2020-02-09 | End: 2020-02-09 | Stop reason: HOSPADM

## 2020-02-09 RX ORDER — MEPERIDINE HYDROCHLORIDE 25 MG/ML
INJECTION INTRAMUSCULAR; INTRAVENOUS; SUBCUTANEOUS PRN
Status: DISCONTINUED | OUTPATIENT
Start: 2020-02-09 | End: 2020-02-09 | Stop reason: SURG

## 2020-02-09 RX ORDER — MEPERIDINE HYDROCHLORIDE 25 MG/ML
12.5 INJECTION INTRAMUSCULAR; INTRAVENOUS; SUBCUTANEOUS
Status: DISCONTINUED | OUTPATIENT
Start: 2020-02-09 | End: 2020-02-09 | Stop reason: HOSPADM

## 2020-02-09 RX ORDER — SODIUM CHLORIDE, SODIUM LACTATE, POTASSIUM CHLORIDE, CALCIUM CHLORIDE 600; 310; 30; 20 MG/100ML; MG/100ML; MG/100ML; MG/100ML
INJECTION, SOLUTION INTRAVENOUS CONTINUOUS
Status: DISCONTINUED | OUTPATIENT
Start: 2020-02-09 | End: 2020-02-09 | Stop reason: HOSPADM

## 2020-02-09 RX ORDER — CEFAZOLIN SODIUM 1 G/3ML
INJECTION, POWDER, FOR SOLUTION INTRAMUSCULAR; INTRAVENOUS PRN
Status: DISCONTINUED | OUTPATIENT
Start: 2020-02-09 | End: 2020-02-09 | Stop reason: SURG

## 2020-02-09 RX ORDER — SODIUM CHLORIDE, SODIUM LACTATE, POTASSIUM CHLORIDE, CALCIUM CHLORIDE 600; 310; 30; 20 MG/100ML; MG/100ML; MG/100ML; MG/100ML
INJECTION, SOLUTION INTRAVENOUS
Status: DISCONTINUED | OUTPATIENT
Start: 2020-02-09 | End: 2020-02-09 | Stop reason: SURG

## 2020-02-09 RX ORDER — METOPROLOL TARTRATE 1 MG/ML
1 INJECTION, SOLUTION INTRAVENOUS
Status: DISCONTINUED | OUTPATIENT
Start: 2020-02-09 | End: 2020-02-09 | Stop reason: HOSPADM

## 2020-02-09 RX ORDER — OXYCODONE HCL 5 MG/5 ML
10 SOLUTION, ORAL ORAL
Status: COMPLETED | OUTPATIENT
Start: 2020-02-09 | End: 2020-02-09

## 2020-02-09 RX ORDER — PHENYLEPHRINE HCL IN 0.9% NACL 0.5 MG/5ML
SYRINGE (ML) INTRAVENOUS PRN
Status: DISCONTINUED | OUTPATIENT
Start: 2020-02-09 | End: 2020-02-09 | Stop reason: SURG

## 2020-02-09 RX ORDER — ONDANSETRON 2 MG/ML
4 INJECTION INTRAMUSCULAR; INTRAVENOUS
Status: DISCONTINUED | OUTPATIENT
Start: 2020-02-09 | End: 2020-02-09 | Stop reason: HOSPADM

## 2020-02-09 RX ORDER — OXYCODONE HCL 5 MG/5 ML
4 SOLUTION, ORAL ORAL
Status: COMPLETED | OUTPATIENT
Start: 2020-02-09 | End: 2020-02-09

## 2020-02-09 RX ORDER — HALOPERIDOL 5 MG/ML
1 INJECTION INTRAMUSCULAR
Status: DISCONTINUED | OUTPATIENT
Start: 2020-02-09 | End: 2020-02-09 | Stop reason: HOSPADM

## 2020-02-09 RX ORDER — DEXAMETHASONE SODIUM PHOSPHATE 4 MG/ML
INJECTION, SOLUTION INTRA-ARTICULAR; INTRALESIONAL; INTRAMUSCULAR; INTRAVENOUS; SOFT TISSUE PRN
Status: DISCONTINUED | OUTPATIENT
Start: 2020-02-09 | End: 2020-02-09 | Stop reason: SURG

## 2020-02-09 RX ORDER — ONDANSETRON 2 MG/ML
INJECTION INTRAMUSCULAR; INTRAVENOUS PRN
Status: DISCONTINUED | OUTPATIENT
Start: 2020-02-09 | End: 2020-02-09 | Stop reason: HOSPADM

## 2020-02-09 RX ORDER — SODIUM CHLORIDE 9 MG/ML
INJECTION, SOLUTION INTRAVENOUS
Status: COMPLETED
Start: 2020-02-09 | End: 2020-02-09

## 2020-02-09 RX ADMIN — OXYCODONE HYDROCHLORIDE 4 MG: 5 SOLUTION ORAL at 11:32

## 2020-02-09 RX ADMIN — Medication 50 MCG: at 11:02

## 2020-02-09 RX ADMIN — PREGABALIN 50 MG: 25 CAPSULE ORAL at 20:44

## 2020-02-09 RX ADMIN — CEFAZOLIN SODIUM 1 G: 1 INJECTION, SOLUTION INTRAVENOUS at 22:08

## 2020-02-09 RX ADMIN — CEFAZOLIN SODIUM 1 G: 1 INJECTION, SOLUTION INTRAVENOUS at 17:09

## 2020-02-09 RX ADMIN — ONDANSETRON 4 MG: 2 INJECTION INTRAMUSCULAR; INTRAVENOUS at 11:05

## 2020-02-09 RX ADMIN — Medication 50 MCG: at 10:45

## 2020-02-09 RX ADMIN — ACETAMINOPHEN 650 MG: 325 TABLET, FILM COATED ORAL at 09:15

## 2020-02-09 RX ADMIN — SIMVASTATIN 40 MG: 40 TABLET, FILM COATED ORAL at 20:44

## 2020-02-09 RX ADMIN — PROPOFOL 30 MG: 10 INJECTION, EMULSION INTRAVENOUS at 10:02

## 2020-02-09 RX ADMIN — Medication 50 MCG: at 10:12

## 2020-02-09 RX ADMIN — PROPOFOL 10 MG: 10 INJECTION, EMULSION INTRAVENOUS at 10:00

## 2020-02-09 RX ADMIN — OXYBUTYNIN CHLORIDE 10 MG: 10 TABLET, EXTENDED RELEASE ORAL at 05:31

## 2020-02-09 RX ADMIN — TRAMADOL HYDROCHLORIDE 50 MG: 50 TABLET, FILM COATED ORAL at 02:55

## 2020-02-09 RX ADMIN — MEPERIDINE HYDROCHLORIDE 6.25 MG: 25 INJECTION INTRAMUSCULAR; INTRAVENOUS; SUBCUTANEOUS at 11:04

## 2020-02-09 RX ADMIN — CEFAZOLIN 1 G: 330 INJECTION, POWDER, FOR SOLUTION INTRAMUSCULAR; INTRAVENOUS at 09:58

## 2020-02-09 RX ADMIN — MEPERIDINE HYDROCHLORIDE 12.5 MG: 25 INJECTION INTRAMUSCULAR; INTRAVENOUS; SUBCUTANEOUS at 10:16

## 2020-02-09 RX ADMIN — LIDOCAINE HYDROCHLORIDE 20 MG: 20 INJECTION, SOLUTION EPIDURAL; INFILTRATION; INTRACAUDAL at 10:00

## 2020-02-09 RX ADMIN — ALFENTANIL HYDROCHLORIDE 500 MCG: 500 INJECTION INTRAVENOUS at 10:00

## 2020-02-09 RX ADMIN — ROCURONIUM BROMIDE 5 MG: 10 INJECTION, SOLUTION INTRAVENOUS at 10:00

## 2020-02-09 RX ADMIN — EPHEDRINE SULFATE 8 MG: 50 INJECTION, SOLUTION INTRAVENOUS at 10:11

## 2020-02-09 RX ADMIN — SODIUM CHLORIDE: 9 INJECTION, SOLUTION INTRAVENOUS at 13:10

## 2020-02-09 RX ADMIN — SENNOSIDES AND DOCUSATE SODIUM 2 TABLET: 8.6; 5 TABLET ORAL at 17:10

## 2020-02-09 RX ADMIN — SODIUM CHLORIDE, POTASSIUM CHLORIDE, SODIUM LACTATE AND CALCIUM CHLORIDE: 600; 310; 30; 20 INJECTION, SOLUTION INTRAVENOUS at 09:58

## 2020-02-09 RX ADMIN — DEXAMETHASONE SODIUM PHOSPHATE 4 MG: 4 INJECTION, SOLUTION INTRA-ARTICULAR; INTRALESIONAL; INTRAMUSCULAR; INTRAVENOUS; SOFT TISSUE at 10:05

## 2020-02-09 RX ADMIN — SODIUM CHLORIDE, POTASSIUM CHLORIDE, SODIUM LACTATE AND CALCIUM CHLORIDE: 600; 310; 30; 20 INJECTION, SOLUTION INTRAVENOUS at 11:30

## 2020-02-09 ASSESSMENT — PAIN SCALES - GENERAL: PAIN_LEVEL: 0

## 2020-02-09 ASSESSMENT — PATIENT HEALTH QUESTIONNAIRE - PHQ9
SUM OF ALL RESPONSES TO PHQ9 QUESTIONS 1 AND 2: 0
1. LITTLE INTEREST OR PLEASURE IN DOING THINGS: NOT AT ALL
2. FEELING DOWN, DEPRESSED, IRRITABLE, OR HOPELESS: NOT AT ALL

## 2020-02-09 NOTE — ANESTHESIA POSTPROCEDURE EVALUATION
Patient: Pau Escalera    Procedure Summary     Date:  02/09/20 Room / Location:  [facility] 14 / SURGERY [facility]    Anesthesia Start:  0958 Anesthesia Stop:  1113    Procedure:  INSERTION, INTRAMEDULLARY TOLU, FEMUR (Left Hip) Diagnosis:  (Left proximal femur fx)    Surgeon:  Moises Middleton M.D. Responsible Provider:  Tonny Dietrich M.D.    Anesthesia Type:  general ASA Status:  4          Final Anesthesia Type: general  Last vitals  BP   Blood Pressure : 139/41    Temp   36.3 °C (97.3 °F)    Pulse   Pulse: 78   Resp   17    SpO2   100 %      Anesthesia Post Evaluation    Patient location during evaluation: PACU  Patient participation: complete - patient participated  Level of consciousness: awake and alert  Pain score: 0    Airway patency: patent  Anesthetic complications: no  Cardiovascular status: hemodynamically stable  Respiratory status: acceptable  Hydration status: euvolemic    PONV: none           Nurse Pain Score: 3 (NPRS)

## 2020-02-09 NOTE — RESPIRATORY CARE
COPD EDUCATION by COPD CLINICAL EDUCATOR  2/9/2020 at 7:36 AM by Saima Andesron, RRT     Patient reviewed by COPD education team. Patient does not have a history or diagnosis of COPD and is a non-smoker, therefore does not qualify for the COPD program.

## 2020-02-09 NOTE — ANESTHESIA PROCEDURE NOTES
Airway  Date/Time: 2/9/2020 10:03 AM  Performed by: Tonny Dietrich M.D.  Authorized by: Tonny Dietrich M.D.     Final Airway Type:  Supraglottic airway  Final Supraglottic Airway:  Standard LMA  SGA Size:  3

## 2020-02-09 NOTE — OP REPORT
DATE OF SERVICE:  02/09/2020    PREOPERATIVE DIAGNOSES:  1.  Left intertrochanteric/subtrochanteric femur fracture, closed, displaced.  2.  Left hemiplegia, chronic - sequelae of stroke.    POSTOPERATIVE DIAGNOSES:  1.  Left intertrochanteric/subtrochanteric femur fracture, closed, displaced.  2.  Left hemiplegia, chronic - sequelae of stroke.    SURGICAL PROCEDURE:  Intramedullary nailing of left hip.    SURGEON:  Moises Middleton MD    ASSISTANT:  Gen Gilbert MD    ANESTHESIOLOGIST:  Tonny Dietrich MD    ANESTHETIC:  General.    ESTIMATED BLOOD LOSS:  150 mL.    INDICATIONS:  The patient is 89 years old, has a history of stroke that has   left her hemiplegic.  She is nonambulatory.  She is mostly in a wheelchair.    She had a fall yesterday sustaining a left proximal femur fracture with   comminution, extension down to the mid shaft of the femur.  Was seen by Dr. Ivory who has asked me to oversee her orthopedic care.  I recommended   intramedullary nailing.  Risks include bleeding, infection, neurovascular   injury, pain, stiffness, arthritis, nonunion, malunion, fracture,   thromboembolic phenomena, anesthetic and medical complications, etc.    DESCRIPTION OF PROCEDURE:  The patient was identified in the preoperative   holding area.  Her left leg was marked.  She was taken to the operating room   where general anesthetic was administered.  Intravenous antibiotics were   given.  She was placed on the fracture table with her feet in the boots.  Her   left foot was contracted and did not fit well.  We had to secure this to the   boot with Coban.  A small amount of traction was applied followed by internal   rotation so that the patella was pointing up.  Lateral hip, thigh, and knee   were then prepped and draped in sterile fashion.  Time-out was held to confirm   the patient identity and correct surgical site.    There was some residual medialization of the shaft and the comminuted medial   fragment as well as  posterior translation and there was flexion of the   proximal fragment.  We placed a guidewire to the tip of the greater trochanter   and then inserted this parallel to the proximal fragment.  We then made an   incision laterally and manipulated the fragments into better position,   although we did not open up completely and get it anatomic, we were trying for   limited invasiveness due to limited demands of the patient as well as significant   comorbidities.  We then used the starter reamer over the wire to the level of   lesser trochanter and then passed the ball-tip guidewire down the medullary   canal, measured the length and then passed a 13-mm reamer.  We then inserted a   [facility] 320x11 mm hip nail.  Guidewire was inserted into the near center-center   position of femoral head and the cannulated drill was placed over this.  We   then inserted the lag screw.  We noted that the nail did cause flexion of the   proximal fragment.  However, the alignment was good on the AP view.  We   elected not to remove the nail and compromise our proximal fixation again due   to comorbidities and limited demands.  We removed the jig proximally after   tightening the set screw.  The nail was locked distally with 2 interlocking   screws after stab incision and predrilling.  Wounds were irrigated and then   closed with 2-0 Vicryl and staples.  Dressings were applied.  The patient was   taken off the fracture table, extubated, and taken to recovery room in stable   condition.    POSTOPERATIVE PLAN:  1.  Intravenous antibiotics for 24 hours.  2.  Mobilization to chair as per preop mobility.  3.  DVT prophylaxis.  4.  Wound check, baseline radiographs, and staple removal in approximately   10-14 days.  5.  Ongoing preoperative medical management per hospitalist.       ____________________________________     MD ALEXIS QUINN / KHUSHBU    DD:  02/09/2020 12:50:09  DT:  02/09/2020 14:09:02    D#:  [id number]  Job#:  [id number]

## 2020-02-09 NOTE — OR NURSING
VSS.  Oximetry > 94% on 2L NC.  C/O mild hip discomfort, left.  Left proximal and distal femur dsgs with scant bloody drainage.  LLE warm, with positive sensation and brisk cap refill. Taking PO without incident.  Daughter updated via phone.

## 2020-02-09 NOTE — ASSESSMENT & PLAN NOTE
-with residual left-sided weakness  -on Plavix. Holding for surgery. Resume when OK by Ortho  -statin  -PT OT

## 2020-02-09 NOTE — PROGRESS NOTES
S: Doing well. Minimal pain in hip    O: No motor in left leg. Sensation intact. No skin breaks    A/P  89 y.o. woman with left intertroch hip fx to OR today  NPO  Hold plavix     Bhavana Ivory M.D.

## 2020-02-09 NOTE — ANESTHESIA QCDR
2019 [facility] Clinical Data Registry (for Quality Improvement)     Postoperative nausea/vomiting risk protocol (Adult = 18 yrs and Pediatric 3-17 yrs)- (430 and 463)  General inhalation anesthetic (NOT TIVA) with PONV risk factors: Yes  Provision of anti-emetic therapy with at least 2 different classes of agents: Yes   Patient DID NOT receive anti-emetic therapy and reason is documented in Medical Record:  N/A    Multimodal Pain Management- (477)  Non-emergent surgery AND patient age >= 18: No  Use of Multimodal Pain Management, two or more drugs and/or interventions, NOT including systemic opioids:   Exception: Documented allergy to multiple classes of analgesics:     Smoking Abstinence (404)  Patient is current smoker (cigarette, pipe, e-cig, marijuanna): No  Elective Surgery:   Abstinence instructions provided prior to day of surgery:   Patient abstained from smoking on day of surgery:     Pre-Op Beta-Blocker in Isolated CABG (44)  Isolated CABG AND patient age >= 18: No  Beta-blocker admin within 24 hours of surgical incision:   Exception:of medical reason(s) for not administering beta blocker within 24 hours prior to surgical incision (e.g., not  indicated,other medical reason):     PACU assessment of acute postoperative pain prior to Anesthesia Care End- Applies to Patients Age = 18- (ABG7)  Initial PACU pain score is which of the following: < 7/10  Patient unable to report pain score: N/A    Post-anesthetic transfer of care checklist/protocol to PACU/ICU- (426 and 427)  Upon conclusion of case, patient transferred to which of the following locations: PACU/Non-ICU  Use of transfer checklist/protocol: Yes  Exclusion: Service Performed in Patient Hospital Room (and thus did not require transfer): N/A  Unplanned admission to ICU related to anesthesia service up through end of PACU care- (MD51)  Unplanned admission to ICU (not initially anticipated at anesthesia start time): No

## 2020-02-09 NOTE — ANESTHESIA PREPROCEDURE EVALUATION
Relevant Problems   NEURO   (+) History of stroke      CARDIAC   (+) Carotid stenosis, left   (+) Occlusion and stenosis of carotid artery without mention of cerebral infarction       Physical Exam    Airway   Mallampati: II  TM distance: >3 FB  Neck ROM: full       Cardiovascular - normal exam  Rhythm: regular  Rate: normal     Dental - normal exam         Pulmonary - normal exam  Breath sounds clear to auscultation     Abdominal    Neurological - normal exam                 Anesthesia Plan    ASA 4       Plan - general       Airway plan will be LMA        Induction: intravenous    Postoperative Plan: Postoperative administration of opioids is intended.    Pertinent diagnostic labs and testing reviewed    Informed Consent:    Anesthetic plan and risks discussed with patient.    Use of blood products discussed with: patient whom consented to blood products.

## 2020-02-09 NOTE — CARE PLAN
Problem: Communication  Goal: The ability to communicate needs accurately and effectively will improve  Outcome: PROGRESSING AS EXPECTED  Patient communicating needs to staff appropriately. Will continue to assess for any needs.      Problem: Venous Thromboembolism (VTW)/Deep Vein Thrombosis (DVT) Prevention:  Goal: Patient will participate in Venous Thrombosis (VTE)/Deep Vein Thrombosis (DVT)Prevention Measures  Outcome: PROGRESSING AS EXPECTED   Patient wearing SCD's for mechanical VTE prophylaxis.

## 2020-02-09 NOTE — ANESTHESIA TIME REPORT
Anesthesia Start and Stop Event Times     Date Time Event    2/9/2020 0923 Ready for Procedure     0958 Anesthesia Start     1113 Anesthesia Stop        Responsible Staff  02/09/20    Name Role Begin End    Tonny Dietrich M.D. Anesth 0958 1113        Preop Diagnosis (Free Text):  Pre-op Diagnosis     Left proximal femur fx        Preop Diagnosis (Codes):    Post op Diagnosis  Femoral neck fracture (HCC)      Premium Reason  E. Weekend    Comments:

## 2020-02-09 NOTE — ASSESSMENT & PLAN NOTE
-mechanical fall at [facility] ([place]  -comminuted displaced and angulated acute fracture of the left proximal femur  -Orthopedics following - plan for surgery today  -pain management - cautious use of narcotics given her age  -Fall Precautions  -PT OT  -SNF

## 2020-02-09 NOTE — CARE PLAN
Problem: Safety  Goal: Will remain free from falls  Outcome: PROGRESSING AS EXPECTED  Note:   Pt is assessed as a high fall risk. Bed alarm in place.      Problem: Skin Integrity  Goal: Risk for impaired skin integrity will decrease  Outcome: PROGRESSING AS EXPECTED  Note:   Waffle mattress in place. Pt encouraged to turn every 2 hours.     Problem: Communication  Goal: The ability to communicate needs accurately and effectively will improve  Outcome: PROGRESSING SLOWER THAN EXPECTED  Note:   Pt is hard of hearing but otherwise communicates needs appropriately. Call light within reach; white board updated; hourly rounding in progress.

## 2020-02-09 NOTE — PROGRESS NOTES
2 RN skin check complete with Jane.   Devices in place nc.  Skin assessed under devices no breakdown.  Confirmed pressure ulcers found on NA.  New potential pressure ulcers noted on NA.   The following interventions in place educated on Q2 turns, barrier cream for incontinence, and pillows for comfort.  Admit complete.     Daughter Tony confirms with mother surgery is needed and both are ok to proceed with surgery when able to be scheduled tomorrow. Will update MD/PA.

## 2020-02-10 ENCOUNTER — HOME CARE VISIT (OUTPATIENT)
Dept: HOSPICE | Facility: HOSPICE | Age: 85
End: 2020-02-10
Payer: MEDICARE

## 2020-02-10 ENCOUNTER — HOSPICE ADMISSION (OUTPATIENT)
Dept: HOSPICE | Facility: HOSPICE | Age: 85
End: 2020-02-10
Payer: MEDICARE

## 2020-02-10 PROBLEM — D64.9 ANEMIA: Status: ACTIVE | Noted: 2020-02-10

## 2020-02-10 LAB
ANION GAP SERPL CALC-SCNC: 7 MMOL/L (ref 0–11.9)
BASOPHILS # BLD AUTO: 0.2 % (ref 0–1.8)
BASOPHILS # BLD: 0.04 K/UL (ref 0–0.12)
BUN SERPL-MCNC: 20 MG/DL (ref 8–22)
CALCIUM SERPL-MCNC: 8.3 MG/DL (ref 8.5–10.5)
CHLORIDE SERPL-SCNC: 104 MMOL/L (ref 96–112)
CO2 SERPL-SCNC: 27 MMOL/L (ref 20–33)
CREAT SERPL-MCNC: 0.77 MG/DL (ref 0.5–1.4)
EOSINOPHIL # BLD AUTO: 0 K/UL (ref 0–0.51)
EOSINOPHIL NFR BLD: 0 % (ref 0–6.9)
ERYTHROCYTE [DISTWIDTH] IN BLOOD BY AUTOMATED COUNT: 48.2 FL (ref 35.9–50)
GLUCOSE SERPL-MCNC: 145 MG/DL (ref 65–99)
HCT VFR BLD AUTO: 26.5 % (ref 37–47)
HGB BLD-MCNC: 8.5 G/DL (ref 12–16)
IMM GRANULOCYTES # BLD AUTO: 0.13 K/UL (ref 0–0.11)
IMM GRANULOCYTES NFR BLD AUTO: 0.7 % (ref 0–0.9)
LYMPHOCYTES # BLD AUTO: 1.79 K/UL (ref 1–4.8)
LYMPHOCYTES NFR BLD: 9.5 % (ref 22–41)
MCH RBC QN AUTO: 30.7 PG (ref 27–33)
MCHC RBC AUTO-ENTMCNC: 32.1 G/DL (ref 33.6–35)
MCV RBC AUTO: 95.7 FL (ref 81.4–97.8)
MONOCYTES # BLD AUTO: 1.48 K/UL (ref 0–0.85)
MONOCYTES NFR BLD AUTO: 7.8 % (ref 0–13.4)
NEUTROPHILS # BLD AUTO: 15.45 K/UL (ref 2–7.15)
NEUTROPHILS NFR BLD: 81.8 % (ref 44–72)
NRBC # BLD AUTO: 0 K/UL
NRBC BLD-RTO: 0 /100 WBC
PLATELET # BLD AUTO: 160 K/UL (ref 164–446)
PMV BLD AUTO: 9.9 FL (ref 9–12.9)
POTASSIUM SERPL-SCNC: 4.6 MMOL/L (ref 3.6–5.5)
RBC # BLD AUTO: 2.77 M/UL (ref 4.2–5.4)
SODIUM SERPL-SCNC: 138 MMOL/L (ref 135–145)
WBC # BLD AUTO: 18.9 K/UL (ref 4.8–10.8)

## 2020-02-10 PROCEDURE — 700102 HCHG RX REV CODE 250 W/ 637 OVERRIDE(OP): Performed by: HOSPITALIST

## 2020-02-10 PROCEDURE — 99232 SBSQ HOSP IP/OBS MODERATE 35: CPT | Performed by: NURSE PRACTITIONER

## 2020-02-10 PROCEDURE — 97535 SELF CARE MNGMENT TRAINING: CPT

## 2020-02-10 PROCEDURE — 700111 HCHG RX REV CODE 636 W/ 250 OVERRIDE (IP): Performed by: ORTHOPAEDIC SURGERY

## 2020-02-10 PROCEDURE — 80048 BASIC METABOLIC PNL TOTAL CA: CPT

## 2020-02-10 PROCEDURE — 770006 HCHG ROOM/CARE - MED/SURG/GYN SEMI*

## 2020-02-10 PROCEDURE — 36415 COLL VENOUS BLD VENIPUNCTURE: CPT

## 2020-02-10 PROCEDURE — 97162 PT EVAL MOD COMPLEX 30 MIN: CPT

## 2020-02-10 PROCEDURE — A9270 NON-COVERED ITEM OR SERVICE: HCPCS | Performed by: HOSPITALIST

## 2020-02-10 PROCEDURE — 85025 COMPLETE CBC W/AUTO DIFF WBC: CPT

## 2020-02-10 RX ADMIN — TRAMADOL HYDROCHLORIDE 50 MG: 50 TABLET, FILM COATED ORAL at 18:03

## 2020-02-10 RX ADMIN — OXYBUTYNIN CHLORIDE 10 MG: 10 TABLET, EXTENDED RELEASE ORAL at 05:09

## 2020-02-10 RX ADMIN — PREGABALIN 50 MG: 25 CAPSULE ORAL at 20:15

## 2020-02-10 RX ADMIN — SIMVASTATIN 40 MG: 40 TABLET, FILM COATED ORAL at 20:15

## 2020-02-10 RX ADMIN — TRAMADOL HYDROCHLORIDE 50 MG: 50 TABLET, FILM COATED ORAL at 08:18

## 2020-02-10 RX ADMIN — CEFAZOLIN SODIUM 1 G: 1 INJECTION, SOLUTION INTRAVENOUS at 05:14

## 2020-02-10 RX ADMIN — SENNOSIDES AND DOCUSATE SODIUM 2 TABLET: 8.6; 5 TABLET ORAL at 16:10

## 2020-02-10 RX ADMIN — ACETAMINOPHEN 650 MG: 325 TABLET, FILM COATED ORAL at 05:09

## 2020-02-10 RX ADMIN — ACETAMINOPHEN 650 MG: 325 TABLET, FILM COATED ORAL at 11:38

## 2020-02-10 RX ADMIN — SENNOSIDES AND DOCUSATE SODIUM 2 TABLET: 8.6; 5 TABLET ORAL at 05:09

## 2020-02-10 RX ADMIN — ENOXAPARIN SODIUM 40 MG: 100 INJECTION SUBCUTANEOUS at 05:09

## 2020-02-10 ASSESSMENT — ENCOUNTER SYMPTOMS
BACK PAIN: 0
BLURRED VISION: 0
SHORTNESS OF BREATH: 0
COUGH: 0
HEADACHES: 0
DIZZINESS: 0
VOMITING: 0
FEVER: 0
PALPITATIONS: 0
NAUSEA: 0
INSOMNIA: 0
FLANK PAIN: 0
CHILLS: 0
BACK PAIN: 1
SORE THROAT: 0
DOUBLE VISION: 0
NERVOUS/ANXIOUS: 0
FALLS: 1

## 2020-02-10 ASSESSMENT — PAIN SCALES - WONG BAKER: WONGBAKER_NUMERICALRESPONSE: HURTS JUST A LITTLE BIT

## 2020-02-10 ASSESSMENT — COGNITIVE AND FUNCTIONAL STATUS - GENERAL
TURNING FROM BACK TO SIDE WHILE IN FLAT BAD: UNABLE
CLIMB 3 TO 5 STEPS WITH RAILING: TOTAL
MOVING TO AND FROM BED TO CHAIR: UNABLE
WALKING IN HOSPITAL ROOM: A LOT
SUGGESTED CMS G CODE MODIFIER MOBILITY: CM
MOVING FROM LYING ON BACK TO SITTING ON SIDE OF FLAT BED: UNABLE
MOBILITY SCORE: 8
STANDING UP FROM CHAIR USING ARMS: A LOT

## 2020-02-10 ASSESSMENT — GAIT ASSESSMENTS: GAIT LEVEL OF ASSIST: UNABLE TO PARTICIPATE

## 2020-02-10 NOTE — PROGRESS NOTES
[facility] Medicine Daily Progress Note    Date of Service  2/10/2020    Chief Complaint  Left Hip Pain    Hospital Course   Ms. Escalera is a 89-year-old female with PMH significant for stroke with left-sided deficits on Plavix who presented from [facility] on 2/8/2020 with left hip pain following a fall.  Head CT showed no acute abnormalities.  Imaging showed a left oblique femoral fracture.  Orthopedics was consulted and she underwent left IMN by Dr. Middleton on 2/9/2020.     Interval Problem Update  -seen and examined prior to surgery. She is quite [place]. Reports adequate pain control. Aware of surgery today.    Consultants/Specialty  -Orthopedics    Code Status  FULL    Disposition  SNF    Review of Systems  Review of Systems   Constitutional: Positive for malaise/fatigue. Negative for chills and fever.   HENT: Positive for hearing loss. Negative for sore throat.    Eyes: Negative for blurred vision and double vision.   Respiratory: Negative for cough and shortness of breath.    Cardiovascular: Negative for chest pain and palpitations.   Gastrointestinal: Negative for nausea and vomiting.   Genitourinary: Negative for flank pain and hematuria.   Musculoskeletal: Positive for back pain, falls and joint pain.   Neurological: Negative for dizziness and headaches.   Psychiatric/Behavioral: The patient is not nervous/anxious and does not have insomnia.    All other systems reviewed and are negative.       Physical Exam  Temp:  [36.1 °C (97 °F)-37.1 °C (98.8 °F)] 36.2 °C (97.1 °F)  Pulse:  [] 72  Resp:  [15-19] 16  BP: ()/(41-79) 104/59  SpO2:  [97 %-100 %] 100 %    Physical Exam  Vitals signs and nursing note reviewed.   Constitutional:       General: She is sleeping.      Appearance: Normal appearance. She is not toxic-appearing.   HENT:      Head: Normocephalic.      Right Ear: Decreased hearing noted.      Left Ear: Decreased hearing noted.      Nose: Nose normal.      Mouth/Throat:      Lips: Pink.       Mouth: Mucous membranes are moist.   Cardiovascular:      Rate and Rhythm: Normal rate and regular rhythm.      Pulses: Normal pulses.      Heart sounds: Murmur present.   Pulmonary:      Effort: Pulmonary effort is normal.      Breath sounds: Normal breath sounds. No rhonchi.   Abdominal:      General: Bowel sounds are normal.      Palpations: Abdomen is soft.      Tenderness: There is no tenderness.   Musculoskeletal:      Left hip: She exhibits decreased range of motion, decreased strength and bony tenderness.      Right lower leg: No edema.      Left lower leg: No edema.   Skin:     General: Skin is warm and dry.   Neurological:      General: No focal deficit present.      Mental Status: She is oriented to person, place, and time and easily aroused.      Sensory: Sensation is intact.   Psychiatric:         Attention and Perception: Attention normal.         Behavior: Behavior is cooperative.         Cognition and Memory: Cognition normal.         Judgment: Judgment normal.         Fluids    Intake/Output Summary (Last 24 hours) at 2/10/2020 0739  Last data filed at 2/10/2020 0500  Gross per 24 hour   Intake 1348 ml   Output 150 ml   Net 1198 ml       Laboratory  Recent Labs     02/08/20  1030 02/09/20  0456   WBC 9.9 16.2*   RBC 4.67 3.83*   HEMOGLOBIN 14.7 11.8*   HEMATOCRIT 44.0 37.0   MCV 94.2 96.6   MCH 31.5 30.8   MCHC 33.4* 31.9*   RDW 46.5 47.9   PLATELETCT 280 205   MPV 9.2 9.1     Recent Labs     02/08/20  1030 02/09/20  0456   SODIUM 141 139   POTASSIUM 4.0 4.1   CHLORIDE 102 106   CO2 30 25   GLUCOSE 128* 138*   BUN 13 18   CREATININE 0.68 0.59   CALCIUM 9.2 8.8     Recent Labs     02/08/20  1030   INR 1.00         Recent Labs     02/09/20  0456   TRIGLYCERIDE 63   HDL 50   LDL 63       Imaging  DX-FEMUR-2+ LEFT   Final Result      Portable fluoroscopic images obtained in the OR for localization during internal fixation left femur fracture.      DX-FEMUR-2+ LEFT   Final Result      Acute,  "obliquely oriented proximal femoral fracture, with subtrochanteric and intertrochanteric components.      DX-HIP-COMPLETE - UNILATERAL 2+ LEFT   Final Result      1.  Findings indicate comminuted displaced and angulated acute fracture of the left proximal femur.      2.  Bones appear osteopenic.      DX-CHEST-PORTABLE (1 VIEW)   Final Result         1.  There is some diffuse increased prominence of interstitial opacifications including the perihilar regions of each lung which could indicate pulmonary edema or inflammation.      2.  No consolidations identified.      CT-HEAD W/O   Final Result         1.  NO ACUTE ABNORMALITIES ARE NOTED ON CT SCAN OF THE HEAD.      2.  Extensive right-sided encephalomalacia likely due to prior infarct.      Findings are consistent with atrophy.  Decreased attenuation in the periventricular white matter likely indicates microvascular ischemic disease.      DX-PORTABLE FLUORO > 1 HOUR    (Results Pending)        Assessment/Plan  * Femur fracture (HCC)- (present on admission)  Assessment & Plan  -see \"fall\"    History of stroke- (present on admission)  Assessment & Plan  -with residual left-sided weakness  -on Plavix. Holding for surgery. Resume when OK by Ortho  -statin  -PT OT    Spinal stenosis of lumbar region- (present on admission)  Assessment & Plan  -chronic and stable at her baseline    Fall- (present on admission)  Assessment & Plan  -mechanical fall at [facility] ([place])  -comminuted displaced and angulated acute fracture of the left proximal femur  -Orthopedics following - plan for surgery today  -pain management - cautious use of narcotics given her age  -Fall Precautions  -PT OT  -SNF       VTE prophylaxis: SCD    Please note that this dictation was created using voice recognition software. I have made every reasonable attempt to correct obvious errors, but there may be errors of grammar and possibly content that I did not discover before finalizing the " note.    SALONI Lawrence.

## 2020-02-10 NOTE — DISCHARGE PLANNING
Received Choice form at 1400  Agency/Facility Name: [facility]  Referral sent per Choice form @ [phone]

## 2020-02-10 NOTE — DISCHARGE PLANNING
Care Transition Team Assessment    LSW met w/ pt and pt's dtr, Tony, at bedside.  Tony confirmed that pt lives at [facility] on [address].  Pt currently uses a wc and needs assistance w/ all (I)ADLs.      Tony is considering hospice for the pt but is concerned as to whether [place] will accept her back w/ hospice.  She is also concerned w/ whether insurance will cover hospice.  LSW discussed that a referral can be sent to the hospice agency of her choice and they will be able to meet w/ her and further explain their services and look into her insurance.    LSW received Hospice choice: 1. [facility]    LSW contacted [facility] and spoke w/ Chivo who confirmed that they would be willing to accept the pt back w/ hospice.  Their RN will call to discuss this w/ dtr, Tony tomorrow.      Information Source  Orientation : Oriented x 4  Information Given By: Other (Comments)  Informant's Name: Tony  Who is responsible for making decisions for patient? : Adult child  Name(s) of Primary Decision Maker: Tony Gates    Elopement Risk  Legal Hold: No  Ambulatory or Self Mobile in Wheelchair: No-Not an Elopement Risk  Elopement Risk: Not at Risk for Elopement    Interdisciplinary Discharge Planning  Patient or legal guardian wants to designate a caregiver (see row info): No  Housing / Facility: Skilled Nursing Facility, Assisted Living Residence  Name of Care Facility: [place]  Do You Take your Prescribed Medications Regularly: Yes    Discharge Preparedness  What is your plan after discharge?: Uncertain - pending medical team collaboration  What are your discharge supports?: Child  Prior Functional Level: Needs Assist with Activities of Daily Living, Needs Assist with Medication Management, Uses Wheelchair  Difficulity with ADLs: Bathing, Brushing teeth, Dressing, Eating, Toileting, Walking  Difficulity with IADLs: Cooking, Driving, Keeping track of finances, Laundry, Managing medication, Shopping, Using the  telephone or computer    Functional Assesment  Prior Functional Level: Needs Assist with Activities of Daily Living, Needs Assist with Medication Management, Uses Wheelchair    Finances  Financial Barriers to Discharge: No  Prescription Coverage: Yes    Vision / Hearing Impairment  Vision Impairment : Yes  Right Eye Vision: Wears Glasses  Left Eye Vision: Wears Glasses  Hearing Impairment : Yes  Hearing Impairment: Both Ears, Hearing Device Not Available  Does Pt Need Special Equipment for the Hearing Impaired?: No              Domestic Abuse  Have you ever been the victim of abuse or violence?: No  Physical Abuse or Sexual Abuse: No  Verbal Abuse or Emotional Abuse: No  Possible Abuse Reported to:: Not Applicable    Psychological Assessment  History of Substance Abuse: None  History of Psychiatric Problems: No  Non-compliant with Treatment: No  Newly Diagnosed Illness: No    Discharge Risks or Barriers  Patient risk factors: Cognitive / sensory / physical deficit, Vulnerable adult    Anticipated Discharge Information  Anticipated discharge disposition: Assisted living, Hospice

## 2020-02-10 NOTE — DISCHARGE PLANNING
ATTN: Case Management   RE: Referral for Hospice    [facility] currently reviewing [facility] referral. A nurse liaison will be in contact with the patient and family to assess for Hospice appropriateness. For immediate assistance, please call [phone] to speak to a member of our intake team.

## 2020-02-10 NOTE — CARE PLAN
Problem: Safety  Goal: Will remain free from falls  Outcome: PROGRESSING AS EXPECTED  Patient bed locked and lowest position, call light within reach, fall alarm on. Patient educated on need to call staff for any needs.      Problem: Pain Management  Goal: Pain level will decrease to patient's comfort goal  Outcome: PROGRESSING AS EXPECTED   Patient reporting pain in left foot. Repositioned LLE and medicated per MAR.

## 2020-02-10 NOTE — PALLIATIVE CARE
Palliative Care follow-up  Consult received and EMR reviewed; case discussed with REKHA Whiting, LAKSHMI RN and LAKSHMI MARIE. LAKSHMI MARIE attempted to visit with dtr but not at BS at this time. Pt with limited function at baseline. Will see what PT/OT evaluation shows and f/u with daughter to further discuss code status, discharge plan and goals after.      Updated: REKHA    Plan: f/u after PT/OT    Thank you for allowing Palliative Care to support this patient and family. Contact [phone] for additional assistance, change in patient status, or with any questions/concerns.

## 2020-02-10 NOTE — THERAPY
"Physical Therapy Evaluation completed.   Bed Mobility:  Supine to Sit: Moderate Assist  Transfers: Sit to Stand: Moderate Assist  Gait: Level Of Assist: Unable to Participate (WC for primary mobility at baseline)    Plan of Care: Will benefit from Physical Therapy 3 times per week  Discharge Recommendations: Equipment: Will Continue to Assess for Equipment Needs and patient has WC. Post-acute therapy: see below.    See \"Rehab Therapy-Acute\" Patient Summary Report for complete documentation.    Patient is a pleasant 89 YO female that is s/p L IM nailing following L intertrochanteric/subtrochanteric femur fracture and now WBAT LLE. PMHx significant for CVA with L sided deficits. She presented to PT with impaired balance and coordination, functional weakness, and decreased activity tolerance though she appears near baseline functional mobility given prior CVA with L sided deficits and nonfunctional use of LUE. She required mod A to move supine to sitting EOB, she was able to initiate RLE off EOB but required assist for LLE and trunk. Once EOB she was able to maintain static sitting balance with intermittent RUE support. She was able to stand with HHA/mod A x4 and scoot up EOB. Pain appeared to be well controlled during session, no complaint. Daughter at bedside reported patient received assist from staff for mobility PTA but she was able to perform stand pivot transfer EOB to  with min A. At this time recommend post acute placement to progress to PLOF for return to [facility] though depending on support available, patient may be able to return to [facility] following medical DC. Will follow while in house.  "

## 2020-02-10 NOTE — PROGRESS NOTES
"Hospital Medicine Daily Progress Note    Date of Service  2/10/2020    Chief Complaint  Left Hip    Hospital Course   Ms. Escalera is a 89-year-old female with PMH significant for stroke with left-sided deficits on Plavix who presented from [facility] on 2/8/2020 with left hip pain following a fall.  Head CT showed no acute abnormalities.  Imaging showed a left oblique femoral fracture. Orthopedics was consulted and she underwent left IMN by Dr. Middleton on 2/9/2020.      Interval Problem Update  2/10 - POD #1 IMN. Slight drop in Hgb. No acute s/s bleeding. Hemodynamically stable. Denies pain.    2/9 - seen and examined prior to surgery. She is quite [place]. Reports adequate pain control. Aware of surgery today    Consultants/Specialty  -Orthopedics    Code Status  FULL    Disposition  SNF    Review of Systems  Review of Systems   Constitutional: Positive for malaise/fatigue. Negative for chills and fever.   HENT: Positive for hearing loss. Negative for sore throat.    Eyes: Negative for blurred vision and double vision.   Respiratory: Negative for cough and shortness of breath.    Cardiovascular: Negative for chest pain and palpitations.   Gastrointestinal: Negative for nausea and vomiting.   Genitourinary: Negative for flank pain and hematuria.   Musculoskeletal: Positive for falls. Negative for back pain and joint pain.        \"I doesn't hurt but it's numb\"   Neurological: Negative for dizziness and headaches.   Psychiatric/Behavioral: The patient is not nervous/anxious and does not have insomnia.    All other systems reviewed and are negative.       Physical Exam  Temp:  [36.1 °C (97 °F)-37.1 °C (98.8 °F)] 36.6 °C (97.9 °F)  Pulse:  [] 68  Resp:  [15-19] 16  BP: ()/(55-79) 111/68  SpO2:  [97 %-100 %] 100 %    Physical Exam  Vitals signs and nursing note reviewed.   Constitutional:       General: She is sleeping. She is not in acute distress.     Appearance: Normal appearance. She is not " toxic-appearing.   HENT:      Head: Normocephalic.      Right Ear: Decreased hearing noted.      Left Ear: Decreased hearing noted.      Nose: Nose normal.      Mouth/Throat:      Lips: Pink.      Mouth: Mucous membranes are moist.   Cardiovascular:      Rate and Rhythm: Normal rate and regular rhythm.      Pulses: Normal pulses.      Heart sounds: Murmur present.   Pulmonary:      Effort: Pulmonary effort is normal.      Breath sounds: Normal breath sounds. No wheezing or rhonchi.   Abdominal:      General: Bowel sounds are normal.      Palpations: Abdomen is soft.      Tenderness: There is no tenderness.   Musculoskeletal:      Left hip: She exhibits decreased range of motion, decreased strength and bony tenderness.      Right lower leg: No edema.      Left lower leg: No edema.   Skin:     General: Skin is warm and dry.      Comments: Surgical dressing left hip CDI.   Neurological:      General: No focal deficit present.      Mental Status: She is oriented to person, place, and time and easily aroused.      Sensory: Sensation is intact.   Psychiatric:         Attention and Perception: Attention normal.         Behavior: Behavior is cooperative.         Cognition and Memory: Cognition normal.         Judgment: Judgment normal.         Fluids    Intake/Output Summary (Last 24 hours) at 2/10/2020 1055  Last data filed at 2/10/2020 1000  Gross per 24 hour   Intake 1468 ml   Output 150 ml   Net 1318 ml       Laboratory  Recent Labs     02/08/20  1030 02/09/20  0456 02/10/20  0947   WBC 9.9 16.2* 18.9*   RBC 4.67 3.83* 2.77*   HEMOGLOBIN 14.7 11.8* 8.5*   HEMATOCRIT 44.0 37.0 26.5*   MCV 94.2 96.6 95.7   MCH 31.5 30.8 30.7   MCHC 33.4* 31.9* 32.1*   RDW 46.5 47.9 48.2   PLATELETCT 280 205 160*   MPV 9.2 9.1 9.9     Recent Labs     02/08/20  1030 02/09/20  0456 02/10/20  0947   SODIUM 141 139 138   POTASSIUM 4.0 4.1 4.6   CHLORIDE 102 106 104   CO2 30 25 27   GLUCOSE 128* 138* 145*   BUN 13 18 20   CREATININE 0.68 0.59  0.77   CALCIUM 9.2 8.8 8.3*     Recent Labs     02/08/20  1030   INR 1.00         Recent Labs     02/09/20  0456   TRIGLYCERIDE 63   HDL 50   LDL 63       Imaging  DX-FEMUR-2+ LEFT   Final Result      Portable fluoroscopic images obtained in the OR for localization during internal fixation left femur fracture.      DX-PORTABLE FLUORO > 1 HOUR   Final Result      Portable fluoroscopy utilized for 1 minute 42 seconds.         INTERPRETING LOCATION: [address] [place] [address], [facility], [id number]      DX-FEMUR-2+ LEFT   Final Result      Acute, obliquely oriented proximal femoral fracture, with subtrochanteric and intertrochanteric components.      DX-HIP-COMPLETE - UNILATERAL 2+ LEFT   Final Result      1.  Findings indicate comminuted displaced and angulated acute fracture of the left proximal femur.      2.  Bones appear osteopenic.      DX-CHEST-PORTABLE (1 VIEW)   Final Result         1.  There is some diffuse increased prominence of interstitial opacifications including the perihilar regions of each lung which could indicate pulmonary edema or inflammation.      2.  No consolidations identified.      CT-HEAD W/O   Final Result         1.  NO ACUTE ABNORMALITIES ARE NOTED ON CT SCAN OF THE HEAD.      2.  Extensive right-sided encephalomalacia likely due to prior infarct.      Findings are consistent with atrophy.  Decreased attenuation in the periventricular white matter likely indicates microvascular ischemic disease.           Assessment/Plan  * Femur fracture (HCC)- (present on admission)  Assessment & Plan  -POD #1 IMN  -pain management  -PT OT  -SNF    Anemia  Assessment & Plan  -post op IMN  -no acute s/s bleeding  -hemodynamically stable  -trend CBC    History of stroke- (present on admission)  Assessment & Plan  -with residual left-sided weakness  -on Plavix. Holding for surgery. Resume when OK by Ortho  -statin  -PT OT    Spinal stenosis of lumbar region- (present on admission)  Assessment & Plan  -chronic and stable at her  baseline    Fall- (present on admission)  Assessment & Plan  -mechanical fall at [facility] ([place])  -comminuted displaced and angulated acute fracture of the left proximal femur  -Orthopedics following - plan for surgery today  -pain management - cautious use of narcotics given her age  -Fall Precautions  -PT OT  -SNF       VTE prophylaxis: SCD     Please note that this dictation was created using voice recognition software. I have made every reasonable attempt to correct obvious errors, but there may be errors of grammar and possibly content that I did not discover before finalizing the note.     SALONI Lawrence.

## 2020-02-10 NOTE — CARE PLAN
Patient's room is located near nurses station. Bed alarm is in use. Patient is compliant with use of call light. Frequent rounding. Charting done near patient's room.

## 2020-02-10 NOTE — CARE PLAN
Patient is on a waffle mattress.  Pillows are in use for support.  Patient is repositioned every two hours.

## 2020-02-10 NOTE — ASSESSMENT & PLAN NOTE
-post op IMN  -hemoglobin 6.5 this AM transfusing 1 unit of RBC   -monitor h/h and transfuse if below 7

## 2020-02-11 ENCOUNTER — HOME CARE VISIT (OUTPATIENT)
Dept: HOSPICE | Facility: HOSPICE | Age: 85
End: 2020-02-11
Payer: MEDICARE

## 2020-02-11 LAB
BASOPHILS # BLD AUTO: 0.2 % (ref 0–1.8)
BASOPHILS # BLD: 0.03 K/UL (ref 0–0.12)
EOSINOPHIL # BLD AUTO: 0.04 K/UL (ref 0–0.51)
EOSINOPHIL NFR BLD: 0.3 % (ref 0–6.9)
ERYTHROCYTE [DISTWIDTH] IN BLOOD BY AUTOMATED COUNT: 48.7 FL (ref 35.9–50)
HCT VFR BLD AUTO: 21.6 % (ref 37–47)
HGB BLD-MCNC: 7 G/DL (ref 12–16)
IMM GRANULOCYTES # BLD AUTO: 0.12 K/UL (ref 0–0.11)
IMM GRANULOCYTES NFR BLD AUTO: 0.9 % (ref 0–0.9)
LYMPHOCYTES # BLD AUTO: 1.75 K/UL (ref 1–4.8)
LYMPHOCYTES NFR BLD: 13 % (ref 22–41)
MCH RBC QN AUTO: 31.5 PG (ref 27–33)
MCHC RBC AUTO-ENTMCNC: 32.4 G/DL (ref 33.6–35)
MCV RBC AUTO: 97.3 FL (ref 81.4–97.8)
MONOCYTES # BLD AUTO: 1.04 K/UL (ref 0–0.85)
MONOCYTES NFR BLD AUTO: 7.7 % (ref 0–13.4)
NEUTROPHILS # BLD AUTO: 10.51 K/UL (ref 2–7.15)
NEUTROPHILS NFR BLD: 77.9 % (ref 44–72)
NRBC # BLD AUTO: 0 K/UL
NRBC BLD-RTO: 0 /100 WBC
PLATELET # BLD AUTO: 143 K/UL (ref 164–446)
PMV BLD AUTO: 9.9 FL (ref 9–12.9)
RBC # BLD AUTO: 2.22 M/UL (ref 4.2–5.4)
WBC # BLD AUTO: 13.5 K/UL (ref 4.8–10.8)

## 2020-02-11 PROCEDURE — 97166 OT EVAL MOD COMPLEX 45 MIN: CPT

## 2020-02-11 PROCEDURE — A9270 NON-COVERED ITEM OR SERVICE: HCPCS | Performed by: HOSPITALIST

## 2020-02-11 PROCEDURE — 99232 SBSQ HOSP IP/OBS MODERATE 35: CPT | Performed by: INTERNAL MEDICINE

## 2020-02-11 PROCEDURE — 700102 HCHG RX REV CODE 250 W/ 637 OVERRIDE(OP): Performed by: HOSPITALIST

## 2020-02-11 PROCEDURE — 36415 COLL VENOUS BLD VENIPUNCTURE: CPT

## 2020-02-11 PROCEDURE — 85025 COMPLETE CBC W/AUTO DIFF WBC: CPT

## 2020-02-11 PROCEDURE — 770006 HCHG ROOM/CARE - MED/SURG/GYN SEMI*

## 2020-02-11 RX ADMIN — POLYETHYLENE GLYCOL 3350 1 PACKET: 17 POWDER, FOR SOLUTION ORAL at 04:32

## 2020-02-11 RX ADMIN — SIMVASTATIN 40 MG: 40 TABLET, FILM COATED ORAL at 19:30

## 2020-02-11 RX ADMIN — OXYBUTYNIN CHLORIDE 10 MG: 10 TABLET, EXTENDED RELEASE ORAL at 04:31

## 2020-02-11 RX ADMIN — SENNOSIDES AND DOCUSATE SODIUM 2 TABLET: 8.6; 5 TABLET ORAL at 17:15

## 2020-02-11 RX ADMIN — TRAMADOL HYDROCHLORIDE 50 MG: 50 TABLET, FILM COATED ORAL at 17:15

## 2020-02-11 RX ADMIN — SENNOSIDES AND DOCUSATE SODIUM 2 TABLET: 8.6; 5 TABLET ORAL at 04:32

## 2020-02-11 RX ADMIN — PREGABALIN 50 MG: 25 CAPSULE ORAL at 19:30

## 2020-02-11 ASSESSMENT — COGNITIVE AND FUNCTIONAL STATUS - GENERAL
HELP NEEDED FOR BATHING: A LOT
PERSONAL GROOMING: A LITTLE
DAILY ACTIVITIY SCORE: 15
DRESSING REGULAR UPPER BODY CLOTHING: A LITTLE
SUGGESTED CMS G CODE MODIFIER DAILY ACTIVITY: CK
TOILETING: A LOT
EATING MEALS: A LITTLE
DRESSING REGULAR LOWER BODY CLOTHING: A LOT

## 2020-02-11 ASSESSMENT — ACTIVITIES OF DAILY LIVING (ADL)
AMBULATION_REQUIRES_ASSISTANCE: 1
DRESSING_REQUIRES_ASSISTANCE: 1
CONTINENCE_REQUIRES_ASSISTANCE: 1
BATHING_REQUIRES_ASSISTANCE: 1
TOILETING: REQUIRES ASSIST

## 2020-02-11 ASSESSMENT — ENCOUNTER SYMPTOMS
SHORTNESS OF BREATH: 0
COUGH: 0
BACK PAIN: 0
HEADACHES: 0
SORE THROAT: 0
PALPITATIONS: 0
FLANK PAIN: 0
NERVOUS/ANXIOUS: 0
CHILLS: 0
DOUBLE VISION: 0
DIZZINESS: 0
VOMITING: 0
NAUSEA: 0
FALLS: 1
BLURRED VISION: 0
FEVER: 0
INSOMNIA: 0

## 2020-02-11 NOTE — PROGRESS NOTES
"Hospital Medicine Daily Progress Note    Date of Service  2/11/2020    Chief Complaint  Left Hip    Hospital Course   Ms. Escalera is a 89-year-old female with PMH significant for stroke with left-sided deficits on Plavix who presented from [facility] on 2/8/2020 with left hip pain following a fall.  Head CT showed no acute abnormalities.  Imaging showed a left oblique femoral fracture. Orthopedics was consulted and she underwent left IMN by Dr. Middleton on 2/9/2020.      Interval Problem Update  Pt seen and examined, afebrile, no overnight events, still with some left lower extremity pain. Denies nausea or vomiting.   S/p surgical repair of her left femur fracture ortho following appreciate rec.     Consultants/Specialty  -Orthopedics    Code Status  FULL    Disposition  SNF    Review of Systems  Review of Systems   Constitutional: Positive for malaise/fatigue. Negative for chills and fever.   HENT: Positive for hearing loss. Negative for ear discharge, ear pain and sore throat.    Eyes: Negative for blurred vision and double vision.   Respiratory: Negative for cough and shortness of breath.    Cardiovascular: Negative for chest pain and palpitations.   Gastrointestinal: Negative for nausea and vomiting.   Genitourinary: Negative for flank pain and hematuria.   Musculoskeletal: Positive for falls. Negative for back pain and joint pain.        \"I doesn't hurt but it's numb\"   Neurological: Negative for dizziness and headaches.   Psychiatric/Behavioral: The patient is not nervous/anxious and does not have insomnia.    All other systems reviewed and are negative.       Physical Exam  Temp:  [36.3 °C (97.3 °F)-36.9 °C (98.5 °F)] 36.9 °C (98.5 °F)  Pulse:  [69-82] 82  Resp:  [16-18] 16  BP: (101-119)/(49-67) 119/58  SpO2:  [92 %-99 %] 96 %    Physical Exam  Vitals signs and nursing note reviewed.   Constitutional:       General: She is sleeping. She is not in acute distress.     Appearance: Normal appearance. She " is not toxic-appearing.   HENT:      Head: Normocephalic.      Right Ear: Decreased hearing noted.      Left Ear: Decreased hearing noted.      Nose: Nose normal.      Mouth/Throat:      Lips: Pink.      Mouth: Mucous membranes are moist.   Eyes:      General:         Right eye: No discharge.         Left eye: No discharge.   Cardiovascular:      Rate and Rhythm: Normal rate and regular rhythm.      Pulses: Normal pulses.      Heart sounds: Murmur present.   Pulmonary:      Effort: Pulmonary effort is normal.      Breath sounds: Normal breath sounds. No wheezing or rhonchi.   Abdominal:      General: Bowel sounds are normal.      Palpations: Abdomen is soft.      Tenderness: There is no tenderness. There is no guarding or rebound.   Musculoskeletal:      Left hip: She exhibits decreased range of motion, decreased strength and bony tenderness.      Right lower leg: No edema.      Left lower leg: No edema.   Skin:     General: Skin is warm and dry.      Comments: Surgical dressing left hip CDI.   Neurological:      General: No focal deficit present.      Mental Status: She is oriented to person, place, and time and easily aroused.      Sensory: Sensation is intact.   Psychiatric:         Attention and Perception: Attention normal.         Behavior: Behavior is cooperative.         Cognition and Memory: Cognition normal.         Judgment: Judgment normal.         Fluids  No intake or output data in the 24 hours ending 02/11/20 1121    Laboratory  Recent Labs     02/09/20  0456 02/10/20  0947 02/11/20  0147   WBC 16.2* 18.9* 13.5*   RBC 3.83* 2.77* 2.22*   HEMOGLOBIN 11.8* 8.5* 7.0*   HEMATOCRIT 37.0 26.5* 21.6*   MCV 96.6 95.7 97.3   MCH 30.8 30.7 31.5   MCHC 31.9* 32.1* 32.4*   RDW 47.9 48.2 48.7   PLATELETCT 205 160* 143*   MPV 9.1 9.9 9.9     Recent Labs     02/09/20  0456 02/10/20  0947   SODIUM 139 138   POTASSIUM 4.1 4.6   CHLORIDE 106 104   CO2 25 27   GLUCOSE 138* 145*   BUN 18 20   CREATININE 0.59 0.77    CALCIUM 8.8 8.3*             Recent Labs     02/09/20  0456   TRIGLYCERIDE 63   HDL 50   LDL 63       Imaging  DX-FEMUR-2+ LEFT   Final Result      Portable fluoroscopic images obtained in the OR for localization during internal fixation left femur fracture.      DX-PORTABLE FLUORO > 1 HOUR   Final Result      Portable fluoroscopy utilized for 1 minute 42 seconds.         INTERPRETING LOCATION: [address] [place] [address], [place], [address]      DX-FEMUR-2+ LEFT   Final Result      Acute, obliquely oriented proximal femoral fracture, with subtrochanteric and intertrochanteric components.      DX-HIP-COMPLETE - UNILATERAL 2+ LEFT   Final Result      1.  Findings indicate comminuted displaced and angulated acute fracture of the left proximal femur.      2.  Bones appear osteopenic.      DX-CHEST-PORTABLE (1 VIEW)   Final Result         1.  There is some diffuse increased prominence of interstitial opacifications including the perihilar regions of each lung which could indicate pulmonary edema or inflammation.      2.  No consolidations identified.      CT-HEAD W/O   Final Result         1.  NO ACUTE ABNORMALITIES ARE NOTED ON CT SCAN OF THE HEAD.      2.  Extensive right-sided encephalomalacia likely due to prior infarct.      Findings are consistent with atrophy.  Decreased attenuation in the periventricular white matter likely indicates microvascular ischemic disease.           Assessment/Plan  * Femur fracture (HCC)- (present on admission)  Assessment & Plan  -POD #2 IMN  -pain management  -PT OT  -SNF    Anemia  Assessment & Plan  -post op IMN  -no acute s/s bleeding  -hemodynamically stable  -monitor h/h and transfuse if below 7     History of stroke- (present on admission)  Assessment & Plan  -with residual left-sided weakness  -on Plavix. Holding for surgery. Resume when OK by Ortho  -statin  -PT OT    Spinal stenosis of lumbar region- (present on admission)  Assessment & Plan  -chronic and stable at her baseline    Fall-  (present on admission)  Assessment & Plan  -mechanical fall at [facility] ([place]  -comminuted displaced and angulated acute fracture of the left proximal femur  -Orthopedics following - plan for surgery today  -pain management - cautious use of narcotics given her age  -Fall Precautions  -PT OT  -SNF       VTE prophylaxis: SCD

## 2020-02-11 NOTE — CARE PLAN
Problem: Venous Thromboembolism (VTW)/Deep Vein Thrombosis (DVT) Prevention:  Goal: Patient will participate in Venous Thrombosis (VTE)/Deep Vein Thrombosis (DVT)Prevention Measures  Outcome: PROGRESSING AS EXPECTED  Intervention: Ensure patient wears graduated elastic stockings (RUSTY hose) and/or SCDs, if ordered, when in bed or chair (Remove at least once per shift for skin check)  Note:   Pt wearing SCD sleeves while in bed, skin intact.     Problem: Skin Integrity  Goal: Risk for impaired skin integrity will decrease  Outcome: PROGRESSING AS EXPECTED  Intervention: Assess risk factors for impaired skin integrity and/or pressure ulcers  Note:   Pt on waffle mattress, Q2H turns encouraged. Bony prominences assessed, all blanching

## 2020-02-11 NOTE — DISCHARGE PLANNING
Anticipated Discharge Disposition: Back to [facility]/ [facility]    Action: LSW contacted [facility] to inform them of pt's return tomorrow.  [place] confirmed that they can provide transportation and will pick the pt up at 1100.  Pt will be returning to them on service w/ [facility].    LSW notified pt's dtr, Tony, via voicemail and MD via TigerText.    Barriers to Discharge: N/A    Plan: As above

## 2020-02-11 NOTE — HOSPICE
ATTN: Provider/Care management team/Nurses    RE: Referral to [facility]    Thank you for the referral to [facility] for the patient listed above. We have made contact with the patient. To access [facility] documentation, click on Chart Review and then click onto Encounters (left top corner of screen).      If you have any questions or concerns regarding the patient’s transition to [facility], please do not hesitate to contact us at [phone].     We look forward to collaborating with you,  [facility] Care Team    Lisa agreed to meet with hospice staff at 11am tomorrow 2/11/20 for hospice discussion.

## 2020-02-11 NOTE — PROGRESS NOTES
Orthopaedic PA Progress Note    Interval changes:Sleeping comfortably , easily aroused. Hospice referral in progress.    ROS - Patient denies any new issues. No chest pain, dyspnea, or fever.  Pain well controlled.    /58   Pulse 82   Temp 36.9 °C (98.5 °F) (Temporal)   Resp 16   Ht 1.524 m (5')   Wt 54 kg (119 lb)   SpO2 96%     Patient seen and examined  No acute distress  Breathing non labored  RRR  Surgical dressing is clean, dry, and intact. Patient clearly fires tibialis anterior, EHL, and gastrocnemius/soleus. Sensation is intact to light touch throughout superficial peroneal, deep peroneal, tibial, saphenous, and sural nerve distributions. Strong and palpable 2+ dorsalis pedis and posterior tibial pulses with capillary refill less than 2 seconds. No lower leg tenderness or discomfort.    Recent Labs     02/09/20  0456 02/10/20  0947 02/11/20  0147   WBC 16.2* 18.9* 13.5*   RBC 3.83* 2.77* 2.22*   HEMOGLOBIN 11.8* 8.5* 7.0*   HEMATOCRIT 37.0 26.5* 21.6*   MCV 96.6 95.7 97.3   MCH 30.8 30.7 31.5   MCHC 31.9* 32.1* 32.4*   RDW 47.9 48.2 48.7   PLATELETCT 205 160* 143*   MPV 9.1 9.9 9.9       Active Hospital Problems    Diagnosis   • Anemia [D64.9]   • History of stroke [Z86.73]   • Femur fracture (HCC) [S72.90XA]   • Spinal stenosis of lumbar region [M48.061]   • Fall [W19.XXXA]       Assessment/Plan:  POD#2 S/P IMN L hip  Wt bearing status - May resume pre-op acivities  PT/OT-initiated  Wound care:Dressings left in place  Drains - no  Mims-no  Sutures/Staples out- 10-14 days post operatively  Antibiotics: complete  DVT Prophylaxis- TEDS/SCDs/Foot pumps.   Lovenox: 40 mg daily Duration-until ambulatory > 150'  Future Procedures - none planned  Case Coordination for Discharge Planning - Disposition awaiting placement  Follow-Up: needs appointment with Dr. Middleton at [facility] at 10-14 days post-op for re-evaluation, staple removal and radiographs.

## 2020-02-11 NOTE — THERAPY
"Occupational Therapy Evaluation completed.   Functional Status:    89 y/o female admitted to hospital with GLF, found to have L femur fx. Now s/p IM nailing, WBAT. Pt has hx of CVA with L sided deficits and is w/c bound. Pt report that she has assistance for nearly all ADL and IADL, however she states that she is generally able to get herself from the bed to the w/c without assistance. She required Mod-max A for bed mobility, Max A for LB cares, Mod A for STS, max A for chair xfer, SUA for feeding. Will continue working with pt in this setting.     Plan of Care: Will benefit from Occupational Therapy 2 times per week  Discharge Recommendations:  Equipment: Will Continue to Assess for Equipment Needs. Post-acute therapy. May be able to return to [place] with adequate support, given that she requires assistance for all ADL at baseline.    See \"Rehab Therapy-Acute\" Patient Summary Report for complete documentation.    "

## 2020-02-11 NOTE — PROGRESS NOTES
Patients hemoglobin resulted in 7.0 this morning, paged MD to update and ask about administrating Lovenox dose this am. Per MD Listjonathan, no transfusion needed at this moment but hold am dose of Lovenox.

## 2020-02-11 NOTE — HOSPICE
ATTN: Provider/Care management team/Nurses    RE: Referral to [facility]    Thank you for the referral to [facility] for the patient listed above. We have made contact with the patient. To access [facility] documentation, click on Chart Review and then click onto Encounters (left top corner of screen).      If you have any questions or concerns regarding the patient’s transition to [facility], please do not hesitate to contact us at [phone].     We look forward to collaborating with you,  [facility] Care Team

## 2020-02-12 LAB
ABO GROUP BLD: NORMAL
ANION GAP SERPL CALC-SCNC: 4 MMOL/L (ref 0–11.9)
BARCODED ABORH UBTYP: 6200
BARCODED PRD CODE UBPRD: NORMAL
BARCODED UNIT NUM UBUNT: NORMAL
BLD GP AB SCN SERPL QL: NORMAL
BUN SERPL-MCNC: 13 MG/DL (ref 8–22)
CALCIUM SERPL-MCNC: 7.9 MG/DL (ref 8.5–10.5)
CHLORIDE SERPL-SCNC: 102 MMOL/L (ref 96–112)
CO2 SERPL-SCNC: 28 MMOL/L (ref 20–33)
COMPONENT R 8504R: NORMAL
CREAT SERPL-MCNC: 0.54 MG/DL (ref 0.5–1.4)
ERYTHROCYTE [DISTWIDTH] IN BLOOD BY AUTOMATED COUNT: 46.5 FL (ref 35.9–50)
GLUCOSE SERPL-MCNC: 126 MG/DL (ref 65–99)
HCT VFR BLD AUTO: 19.5 % (ref 37–47)
HGB BLD-MCNC: 6.5 G/DL (ref 12–16)
MCH RBC QN AUTO: 31.7 PG (ref 27–33)
MCHC RBC AUTO-ENTMCNC: 33.3 G/DL (ref 33.6–35)
MCV RBC AUTO: 95.1 FL (ref 81.4–97.8)
PLATELET # BLD AUTO: 156 K/UL (ref 164–446)
PMV BLD AUTO: 9.6 FL (ref 9–12.9)
POTASSIUM SERPL-SCNC: 4.2 MMOL/L (ref 3.6–5.5)
PRODUCT TYPE UPROD: NORMAL
RBC # BLD AUTO: 2.05 M/UL (ref 4.2–5.4)
RH BLD: NORMAL
SODIUM SERPL-SCNC: 134 MMOL/L (ref 135–145)
UNIT STATUS USTAT: NORMAL
WBC # BLD AUTO: 10.6 K/UL (ref 4.8–10.8)

## 2020-02-12 PROCEDURE — 700102 HCHG RX REV CODE 250 W/ 637 OVERRIDE(OP): Performed by: HOSPITALIST

## 2020-02-12 PROCEDURE — 86901 BLOOD TYPING SEROLOGIC RH(D): CPT

## 2020-02-12 PROCEDURE — 36415 COLL VENOUS BLD VENIPUNCTURE: CPT

## 2020-02-12 PROCEDURE — 770006 HCHG ROOM/CARE - MED/SURG/GYN SEMI*

## 2020-02-12 PROCEDURE — A9270 NON-COVERED ITEM OR SERVICE: HCPCS | Performed by: HOSPITALIST

## 2020-02-12 PROCEDURE — 86850 RBC ANTIBODY SCREEN: CPT

## 2020-02-12 PROCEDURE — 30233N1 TRANSFUSION OF NONAUTOLOGOUS RED BLOOD CELLS INTO PERIPHERAL VEIN, PERCUTANEOUS APPROACH: ICD-10-PCS | Performed by: INTERNAL MEDICINE

## 2020-02-12 PROCEDURE — 51798 US URINE CAPACITY MEASURE: CPT

## 2020-02-12 PROCEDURE — 85027 COMPLETE CBC AUTOMATED: CPT

## 2020-02-12 PROCEDURE — 80048 BASIC METABOLIC PNL TOTAL CA: CPT

## 2020-02-12 PROCEDURE — 99232 SBSQ HOSP IP/OBS MODERATE 35: CPT | Performed by: INTERNAL MEDICINE

## 2020-02-12 PROCEDURE — P9016 RBC LEUKOCYTES REDUCED: HCPCS

## 2020-02-12 PROCEDURE — 86900 BLOOD TYPING SEROLOGIC ABO: CPT

## 2020-02-12 PROCEDURE — 700105 HCHG RX REV CODE 258

## 2020-02-12 PROCEDURE — 36430 TRANSFUSION BLD/BLD COMPNT: CPT

## 2020-02-12 PROCEDURE — 86923 COMPATIBILITY TEST ELECTRIC: CPT

## 2020-02-12 RX ORDER — SODIUM CHLORIDE 9 MG/ML
INJECTION, SOLUTION INTRAVENOUS
Status: COMPLETED
Start: 2020-02-12 | End: 2020-02-12

## 2020-02-12 RX ADMIN — PREGABALIN 50 MG: 25 CAPSULE ORAL at 19:51

## 2020-02-12 RX ADMIN — SENNOSIDES AND DOCUSATE SODIUM 2 TABLET: 8.6; 5 TABLET ORAL at 04:54

## 2020-02-12 RX ADMIN — SIMVASTATIN 40 MG: 40 TABLET, FILM COATED ORAL at 19:51

## 2020-02-12 RX ADMIN — ACETAMINOPHEN 650 MG: 325 TABLET, FILM COATED ORAL at 19:52

## 2020-02-12 RX ADMIN — SODIUM CHLORIDE: 9 INJECTION, SOLUTION INTRAVENOUS at 09:30

## 2020-02-12 RX ADMIN — MAGNESIUM HYDROXIDE 30 ML: 400 SUSPENSION ORAL at 04:54

## 2020-02-12 RX ADMIN — SENNOSIDES AND DOCUSATE SODIUM 2 TABLET: 8.6; 5 TABLET ORAL at 18:29

## 2020-02-12 RX ADMIN — POLYETHYLENE GLYCOL 3350 1 PACKET: 17 POWDER, FOR SOLUTION ORAL at 04:54

## 2020-02-12 RX ADMIN — OXYBUTYNIN CHLORIDE 10 MG: 10 TABLET, EXTENDED RELEASE ORAL at 04:54

## 2020-02-12 ASSESSMENT — PAIN SCALES - WONG BAKER: WONGBAKER_NUMERICALRESPONSE: HURTS A LITTLE MORE

## 2020-02-12 ASSESSMENT — ENCOUNTER SYMPTOMS
SORE THROAT: 0
NAUSEA: 0
DOUBLE VISION: 0
INSOMNIA: 0
SHORTNESS OF BREATH: 0
BLURRED VISION: 0
HEADACHES: 0
PALPITATIONS: 0
FLANK PAIN: 0
FALLS: 1
CHILLS: 0
NERVOUS/ANXIOUS: 0
FEVER: 0
VOMITING: 0
BACK PAIN: 0
COUGH: 0
DIZZINESS: 0

## 2020-02-12 NOTE — PROGRESS NOTES
"Hospital Medicine Daily Progress Note    Date of Service  2/12/2020    Chief Complaint  Left Hip    Hospital Course   Ms. Escalera is a 89-year-old female with PMH significant for stroke with left-sided deficits on Plavix who presented from [facility] on 2/8/2020 with left hip pain following a fall.  Head CT showed no acute abnormalities.  Imaging showed a left oblique femoral fracture. Orthopedics was consulted and she underwent left IMN by Dr. Middleton on 2/9/2020.      Interval Problem Update  Aafebrile, no overnight events, still with some left lower extremity pain. Denies nausea or vomiting. Hemoglobin 6.5 this AM, transfusing 1 unit of RBC   S/p surgical repair of her left femur fracture ortho following appreciate rec.     Consultants/Specialty  -Orthopedics    Code Status  FULL    Disposition  SNF    Review of Systems  Review of Systems   Constitutional: Positive for malaise/fatigue. Negative for chills and fever.   HENT: Positive for hearing loss. Negative for ear discharge, ear pain and sore throat.    Eyes: Negative for blurred vision and double vision.   Respiratory: Negative for cough and shortness of breath.    Cardiovascular: Negative for chest pain and palpitations.   Gastrointestinal: Negative for nausea and vomiting.   Genitourinary: Negative for flank pain and hematuria.   Musculoskeletal: Positive for falls. Negative for back pain and joint pain.        \"I doesn't hurt but it's numb\"   Neurological: Negative for dizziness and headaches.   Psychiatric/Behavioral: The patient is not nervous/anxious and does not have insomnia.    All other systems reviewed and are negative.       Physical Exam  Temp:  [36.3 °C (97.3 °F)-37.2 °C (99 °F)] 36.3 °C (97.3 °F)  Pulse:  [87-92] 88  Resp:  [17] 17  BP: (105-119)/(50-63) 114/50  SpO2:  [92 %-98 %] 95 %    Physical Exam  Vitals signs and nursing note reviewed.   Constitutional:       General: She is sleeping. She is not in acute distress.     Appearance: " Normal appearance. She is not toxic-appearing.   HENT:      Head: Normocephalic.      Right Ear: Decreased hearing noted.      Left Ear: Decreased hearing noted.      Nose: Nose normal.      Mouth/Throat:      Lips: Pink.      Mouth: Mucous membranes are moist.   Eyes:      General:         Right eye: No discharge.         Left eye: No discharge.   Cardiovascular:      Rate and Rhythm: Normal rate and regular rhythm.      Pulses: Normal pulses.      Heart sounds: Murmur present.   Pulmonary:      Effort: Pulmonary effort is normal.      Breath sounds: Normal breath sounds. No wheezing or rhonchi.   Abdominal:      General: Bowel sounds are normal.      Palpations: Abdomen is soft.      Tenderness: There is no abdominal tenderness. There is no guarding or rebound.   Musculoskeletal:      Left hip: She exhibits decreased range of motion, decreased strength and bony tenderness.      Right lower leg: No edema.      Left lower leg: No edema.   Skin:     General: Skin is warm and dry.      Comments: Surgical dressing left hip CDI.   Neurological:      General: No focal deficit present.      Mental Status: She is oriented to person, place, and time and easily aroused.      Sensory: Sensation is intact.   Psychiatric:         Attention and Perception: Attention normal.         Behavior: Behavior is cooperative.         Cognition and Memory: Cognition normal.         Judgment: Judgment normal.         Fluids  No intake or output data in the 24 hours ending 02/12/20 1001    Laboratory  Recent Labs     02/10/20  0947 02/11/20  0147 02/12/20  0239   WBC 18.9* 13.5* 10.6   RBC 2.77* 2.22* 2.05*   HEMOGLOBIN 8.5* 7.0* 6.5*   HEMATOCRIT 26.5* 21.6* 19.5*   MCV 95.7 97.3 95.1   MCH 30.7 31.5 31.7   MCHC 32.1* 32.4* 33.3*   RDW 48.2 48.7 46.5   PLATELETCT 160* 143* 156*   MPV 9.9 9.9 9.6     Recent Labs     02/10/20  0947 02/12/20  0239   SODIUM 138 134*   POTASSIUM 4.6 4.2   CHLORIDE 104 102   CO2 27 28   GLUCOSE 145* 126*   BUN 20  13   CREATININE 0.77 0.54   CALCIUM 8.3* 7.9*                   Imaging  DX-FEMUR-2+ LEFT   Final Result      Portable fluoroscopic images obtained in the OR for localization during internal fixation left femur fracture.      DX-PORTABLE FLUORO > 1 HOUR   Final Result      Portable fluoroscopy utilized for 1 minute 42 seconds.         INTERPRETING LOCATION: [address] [place] [address], [place], [address]      DX-FEMUR-2+ LEFT   Final Result      Acute, obliquely oriented proximal femoral fracture, with subtrochanteric and intertrochanteric components.      DX-HIP-COMPLETE - UNILATERAL 2+ LEFT   Final Result      1.  Findings indicate comminuted displaced and angulated acute fracture of the left proximal femur.      2.  Bones appear osteopenic.      DX-CHEST-PORTABLE (1 VIEW)   Final Result         1.  There is some diffuse increased prominence of interstitial opacifications including the perihilar regions of each lung which could indicate pulmonary edema or inflammation.      2.  No consolidations identified.      CT-HEAD W/O   Final Result         1.  NO ACUTE ABNORMALITIES ARE NOTED ON CT SCAN OF THE HEAD.      2.  Extensive right-sided encephalomalacia likely due to prior infarct.      Findings are consistent with atrophy.  Decreased attenuation in the periventricular white matter likely indicates microvascular ischemic disease.           Assessment/Plan  * Femur fracture (HCC)- (present on admission)  Assessment & Plan  -POD #2 IMN  -pain management  -PT OT  -SNF    Anemia  Assessment & Plan  -post op IMN  -hemoglobin 6.5 this AM transfusing 1 unit of RBC   -monitor h/h and transfuse if below 7     History of stroke- (present on admission)  Assessment & Plan  -with residual left-sided weakness  -on Plavix. Holding for surgery. Resume when OK by Ortho  -statin  -PT OT    Spinal stenosis of lumbar region- (present on admission)  Assessment & Plan  -chronic and stable at her baseline    Fall- (present on admission)  Assessment &  Plan  -mechanical fall at [facility] ([place]  -comminuted displaced and angulated acute fracture of the left proximal femur  -Orthopedics following - plan for surgery today  -pain management - cautious use of narcotics given her age  -Fall Precautions  -PT OT  -SNF       VTE prophylaxis: SCD

## 2020-02-12 NOTE — CONSULTS
Palliative Care    Palliative Care requested for ACP, hospice involved and DC plan in place. Met with [facility] hospice RN, Dr. Camp, and pt's dtr/ALLEN Jimenez for POLST completion only. POLST completed by pt's dtr Tony. Toyn selected, DNR, comfort measures, and no artificial nutrition. This consult did not include a full discussion on goals of care. If the team is desiring a full consult, please call [phone] and alert the Palliative Care team. Please do not place another order. Dr. Vogt made aware via tiger text.     Thank you for allowing Palliative Care to support this patient and family. Contact [phone] for additional assistance, change in patient status, or with any questions/concerns.

## 2020-02-12 NOTE — PROGRESS NOTES
Patients hemoglobin resulted at 6.5 with AM labs. Paged MD to update, spoke with MD Zonia Senior. Per MD since patient currently is asymptomatic and deemed unable to make medical decisions, this RN received no new orders and was instructed to pass results to day team.

## 2020-02-12 NOTE — PALLIATIVE CARE
Palliative Care follow-up  PC RN went to pt's bedside, per Dr. Vogt pt is unable to make medical decisions. No family at bedside. PC RN called pt's dtr/DPOA Tony Tony will not be at the [facility] but states that she will be here at 10:30am tomorrow. Plan to meet Tony at 10:30am, [facility] from hospice updated.         Thank you for allowing Palliative Care to support this patient and family. Contact [phone] for additional assistance, change in patient status, or with any questions/concerns.

## 2020-02-12 NOTE — DISCHARGE PLANNING
SMITA contacted [facility] to cancel transport for today according to a msg from MD saying that pt is not yet ready for dc.    MELODYW set up transport w/ [facility] for them to pick her up tomorrow at 1100.

## 2020-02-12 NOTE — PROGRESS NOTES
2 RN skin check completed with Jaenette RN  Devices in place bilateral SCD sleeves.  Skin assessed under devices yes, skin is intact.  Confirmed pressure ulcers found on none  New potential pressure ulcers noted on labial fold. Wound consult placed per protocol.  Patient is incontinent and had a purewick in place yesterday, skin was assessed and intact. Today patient has an area of slow blanchable redness/ purple area of concern to her left labia. Pure wick was removed , patient now has dry elise pads.  The following interventions in place Q2H turns, barrier cream, waffle overlay on mattress.

## 2020-02-12 NOTE — CARE PLAN
Problem: Communication  Goal: The ability to communicate needs accurately and effectively will improve  Outcome: PROGRESSING AS EXPECTED  Intervention: Educate patient and significant other/support system about the plan of care, procedures, treatments, medications and allow for questions  Note:   Patient communicates needs effectively with use of call light     Problem: Bowel/Gastric:  Goal: Normal bowel function is maintained or improved  Outcome: PROGRESSING AS EXPECTED  Intervention: Educate patient and significant other/support system about signs and symptoms of constipation and interventions to implement  Note:   Pt has not has BM, bowel sounds active. Bowel protocol in place.

## 2020-02-13 ENCOUNTER — HOME CARE VISIT (OUTPATIENT)
Dept: HOSPICE | Facility: HOSPICE | Age: 85
End: 2020-02-13
Payer: MEDICARE

## 2020-02-13 VITALS
SYSTOLIC BLOOD PRESSURE: 132 MMHG | TEMPERATURE: 97.6 F | BODY MASS INDEX: 23.36 KG/M2 | HEART RATE: 85 BPM | DIASTOLIC BLOOD PRESSURE: 62 MMHG | HEIGHT: 60 IN | WEIGHT: 119 LBS | RESPIRATION RATE: 16 BRPM | OXYGEN SATURATION: 99 %

## 2020-02-13 VITALS — DIASTOLIC BLOOD PRESSURE: 76 MMHG | HEART RATE: 78 BPM | SYSTOLIC BLOOD PRESSURE: 104 MMHG | RESPIRATION RATE: 20 BRPM

## 2020-02-13 LAB
ERYTHROCYTE [DISTWIDTH] IN BLOOD BY AUTOMATED COUNT: 49.5 FL (ref 35.9–50)
HCT VFR BLD AUTO: 28.6 % (ref 37–47)
HGB BLD-MCNC: 9.6 G/DL (ref 12–16)
MCH RBC QN AUTO: 31.3 PG (ref 27–33)
MCHC RBC AUTO-ENTMCNC: 33.6 G/DL (ref 33.6–35)
MCV RBC AUTO: 93.2 FL (ref 81.4–97.8)
PLATELET # BLD AUTO: 206 K/UL (ref 164–446)
PMV BLD AUTO: 9.5 FL (ref 9–12.9)
RBC # BLD AUTO: 3.07 M/UL (ref 4.2–5.4)
WBC # BLD AUTO: 11.5 K/UL (ref 4.8–10.8)

## 2020-02-13 PROCEDURE — 99239 HOSP IP/OBS DSCHRG MGMT >30: CPT | Performed by: INTERNAL MEDICINE

## 2020-02-13 PROCEDURE — 6650990 HC HOSPICE AND HOME CARE RX REV CODE 0250

## 2020-02-13 PROCEDURE — G0299 HHS/HOSPICE OF RN EA 15 MIN: HCPCS

## 2020-02-13 PROCEDURE — S9126 HOSPICE CARE, IN THE HOME, P: HCPCS

## 2020-02-13 PROCEDURE — 85027 COMPLETE CBC AUTOMATED: CPT

## 2020-02-13 PROCEDURE — A9270 NON-COVERED ITEM OR SERVICE: HCPCS | Performed by: HOSPITALIST

## 2020-02-13 PROCEDURE — 700102 HCHG RX REV CODE 250 W/ 637 OVERRIDE(OP): Performed by: HOSPITALIST

## 2020-02-13 PROCEDURE — 665036 HSPC NOTICE OF ELECTION NOE

## 2020-02-13 PROCEDURE — 36415 COLL VENOUS BLD VENIPUNCTURE: CPT

## 2020-02-13 RX ORDER — TRAMADOL HYDROCHLORIDE 50 MG/1
50 TABLET ORAL EVERY 6 HOURS PRN
Qty: 12 TAB | Refills: 0 | Status: SHIPPED | OUTPATIENT
Start: 2020-02-13 | End: 2020-02-13

## 2020-02-13 RX ADMIN — OXYBUTYNIN CHLORIDE 10 MG: 10 TABLET, EXTENDED RELEASE ORAL at 04:43

## 2020-02-13 SDOH — ECONOMIC STABILITY: HOUSING INSECURITY: EVIDENCE OF SMOKING MATERIAL: 0

## 2020-02-13 SDOH — ECONOMIC STABILITY: HOUSING INSECURITY: HOME SAFETY: PER FACILITY PROTOCOL.

## 2020-02-13 ASSESSMENT — ENCOUNTER SYMPTOMS
SLEEP QUALITY: FAIR
CONSTIPATION: 1
FATIGUE: 1
STOOL FREQUENCY: LESS THAN DAILY
FATIGUES EASILY: 1
DESCRIPTION OF MEMORY LOSS: SHORT TERM
LAST BOWEL MOVEMENT: 65422
SHORTNESS OF BREATH: 1
DYSPNEA ACTIVITY LEVEL: AT REST
FORGETFULNESS: 1

## 2020-02-13 ASSESSMENT — ACTIVITIES OF DAILY LIVING (ADL)
DRESSING_REQUIRES_ASSISTANCE: 1
AMBULATION_REQUIRES_ASSISTANCE: 1
BATHING_REQUIRES_ASSISTANCE: 1
CONTINENCE_REQUIRES_ASSISTANCE: 1
MONEY MANAGEMENT (EXPENSES/BILLS): TOTALLY DEPENDENT
PHYSICAL_TRANSFER_REQUIRES_ASSISTANCE: 1

## 2020-02-13 ASSESSMENT — PATIENT HEALTH QUESTIONNAIRE - PHQ9: CLINICAL INTERPRETATION OF PHQ2 SCORE: 0

## 2020-02-13 NOTE — DISCHARGE INSTRUCTIONS
Discharge Instructions    Discharged to other by medical transportation with escort. Discharged via wheelchair, hospital escort: Yes.  Special equipment needed: Not Applicable    Be sure to schedule a follow-up appointment with your primary care doctor or any specialists as instructed.     Discharge Plan:   Diet Plan: Discussed  Activity Level: Discussed  Confirmed Follow up Appointment: Patient to Call and Schedule Appointment  Confirmed Symptoms Management: Discussed  Medication Reconciliation Updated: Yes  Influenza Vaccine Indication: Not indicated: Previously immunized this influenza season and > 8 years of age    I understand that a diet low in cholesterol, fat, and sodium is recommended for good health. Unless I have been given specific instructions below for another diet, I accept this instruction as my diet prescription.   Other diet: Regular    Special Instructions: Discharge instructions for the Orthopedic Patient    Follow up with Primary Care Physician within 2 weeks of discharge to home, regarding:  Review of medications and diagnostic testing.  Surveillance for medical complications.  Workup and treatment of osteoporosis, if appropriate.     -Is this a Hip/Knee/Shoulder Joint Replacement patient? No    -Is this patient being discharged with medication to prevent blood clots?  No    · Is patient discharged on Warfarin / Coumadin?   No       Hip Fracture  A hip fracture is a fracture of the upper part of your thigh bone (femur).  What are the causes?  A hip fracture is caused by a direct blow to the side of your hip. This is usually the result of a fall but can occur in other circumstances, such as an automobile accident.  What increases the risk?  There is an increased risk of hip fractures in people with:  · An unsteady walking pattern (gait) and those with conditions that contribute to poor balance, such as Parkinson's disease or dementia.  · Osteopenia and osteoporosis.  · Cancer that spreads to the  leg bones.  · Certain metabolic diseases.  What are the signs or symptoms?  Symptoms of hip fracture include:  · Pain over the injured hip.  · Inability to put weight on the leg in which the fracture occurred (although, some patients are able to walk after a hip fracture).  · Toes and foot of the affected leg point outward when you lie down.  How is this diagnosed?  A physical exam can determine if a hip fracture is likely to have occurred. X-ray exams are needed to confirm the fracture and to look for other injuries. The X-ray exam can help to determine the type of hip fracture. Rarely, the fracture is not visible on an X-ray image and a CT scan or MRI will have to be done.  How is this treated?  The treatment for a fracture is usually surgery. This means using a screw, nail, or chandu to hold the bones in place.  Follow these instructions at home:  Take all medicines as directed by your health care provider.  Contact a health care provider if:  Pain continues, even after taking pain medicine.  This information is not intended to replace advice given to you by your health care provider. Make sure you discuss any questions you have with your health care provider.  Document Released: 12/18/2006 Document Revised: 05/25/2017 Document Reviewed: 07/30/2014  [employer] Interactive Patient Education © 2017 [employer]      Depression / Suicide Risk    As you are discharged from this [facility] facility, it is important to learn how to keep safe from harming yourself.    Recognize the warning signs:  · Abrupt changes in personality, positive or negative- including increase in energy   · Giving away possessions  · Change in eating patterns- significant weight changes-  positive or negative  · Change in sleeping patterns- unable to sleep or sleeping all the time   · Unwillingness or inability to communicate  · Depression  · Unusual sadness, discouragement and loneliness  · Talk of wanting to die  · Neglect of personal  appearance   · Rebelliousness- reckless behavior  · Withdrawal from people/activities they love  · Confusion- inability to concentrate     If you or a loved one observes any of these behaviors or has concerns about self-harm, here's what you can do:  · Talk about it- your feelings and reasons for harming yourself  · Remove any means that you might use to hurt yourself (examples: pills, rope, extension cords, firearm)  · Get professional help from the community (Mental Health, Substance Abuse, psychological counseling)  · Do not be alone:Call your Safe Contact- someone whom you trust who will be there for you.  · Call your local CRISIS HOTLINE 611-9505 or 620-803-1208  · Call your local Children's Mobile Crisis Response Team [place] (971) 822-0510 or www.[employer]ago - teamwork across global offices  · Call the toll free National Suicide Prevention Hotlines   · National Suicide Prevention Lifeline 473-371-TZBU (7116)  · National Hope Line Network 800-SUICIDE (580-3680)

## 2020-02-13 NOTE — DISCHARGE SUMMARY
Discharge Summary    CHIEF COMPLAINT ON ADMISSION  Chief Complaint   Patient presents with   • T-5000 FALL   • Hip Pain   • Rib Pain       Reason for Admission  EMS      CODE STATUS  DNAR/DNI    HPI & HOSPITAL COURSE  This is a 89 y.o. female  with PMH significant for stroke with left-sided deficits on Plavix who presented from [facility] on 2/8/2020 with left hip pain following a fall.  Head CT showed no acute abnormalities.  Imaging showed a left oblique femoral fracture. Orthopedics was consulted and she underwent left IMN by Dr. Middleton on 2/9/2020.  Pt tolerated procedure well. Pt will need to follow up with ortho as outpt in 10-14 days post op  Pt also has some dementia. She had some anemia after surgery requiring 1 unit of RBC. Her hemoglobin now is better.   Daughter wanted to look into hospice for her and so hospice referral was placed. Daughter had a meeting with hospice and pt has been accepted by hospice.  Pt doing well this morning she will be discharged back to  SNF with hospice today     The patient met 2-midnight criteria for an inpatient stay at the time of discharge.      FOLLOW UP ITEMS POST DISCHARGE  Ortho     DISCHARGE DIAGNOSES  Principal Problem:    Femur fracture (HCC) POA: Yes  Active Problems:    Fall POA: Yes    Spinal stenosis of lumbar region POA: Yes    History of stroke POA: Yes    Anemia POA: Unknown  Resolved Problems:    * No resolved hospital problems. *      FOLLOW UP  No future appointments.  Moises Middleton M.D.  [address] [facility] [address]  [phone]      2/24      MEDICATIONS ON DISCHARGE     Medication List      CONTINUE taking these medications      Instructions   acetaminophen 500 MG Tabs  Commonly known as:  TYLENOL   Take 500 mg by mouth every four hours as needed (generlized pain).  Dose:  500 mg     Artificial Tears 0.2-0.2-1 % Soln  Generic drug:  Glycerin-Hypromellose-   Place 1 Drop in both eyes 3 times a day.  Dose:  1 Drop      artificial tears 1.4 % Soln   Place 1 Drop in both eyes 3 times a day as needed (burning eyes).  Dose:  1 Drop     bisacodyl EC 5 MG Tbec   Take 10 mg by mouth 1 time daily as needed for Constipation.  Dose:  10 mg     clopidogrel 75 MG Tabs  Commonly known as:  PLAVIX   Take 75 mg by mouth every morning.  Dose:  75 mg     Cranberry 250 MG Caps   Take 250 mg by mouth 2 Times a Day.  Dose:  250 mg     letrozole 2.5 MG Tabs  Commonly known as:  FEMARA   Take 2.5 mg by mouth every day.  Dose:  2.5 mg     magnesium oxide 400 MG Tabs tablet  Commonly known as:  MAG-OX   Take 400 mg by mouth every evening.  Dose:  400 mg     mometasone 0.1 % lotion  Commonly known as:  ELOCON   Apply 1 Drop to affected area(s) 2 times a day as needed (irritation/discomfort).  Dose:  1 Drop     MULTIVITAMIN/MINERALS PO   Take 1 Tab by mouth every day.  Dose:  1 Tab     oxybutynin SR 10 MG CR tablet  Commonly known as:  DITROPAN-XL   Take 10 mg by mouth every day.  Dose:  10 mg     pregabalin 50 MG capsule  Commonly known as:  LYRICA   Take 50 mg by mouth every bedtime.  Dose:  50 mg     SALONPAS PAIN RELIEF PATCH EX   1 Patch by Apply externally route every morning as needed (pain).  Dose:  1 Patch     simvastatin 40 MG Tabs  Commonly known as:  ZOCOR   Take 40 mg by mouth every bedtime.  Dose:  40 mg     sodium chloride 0.65 % Soln  Commonly known as:  OCEAN   Spray 1 Spray in nose 2 times a day as needed for Congestion.  Dose:  1 Spray     tramadol 50 MG Tabs  Commonly known as:  ULTRAM   Take 1 Tab by mouth every 6 hours as needed for Moderate Pain for up to 3 days.  Dose:  50 mg     Viactiv 500-500-40 MG-UNT-MCG Chew  Generic drug:  Calcium-Vitamin D-Vitamin K   Take 1 Tab by mouth every evening.  Dose:  1 Tab     Vicks DayQuil Cold & Flu 10-5-325 MG Caps  Generic drug:  DM-Phenylephrine-Acetaminophen   Take 1 Cap by mouth every 6 hours as needed (cold/flu).  Dose:  1 Cap        STOP taking these medications    PROBIOTIC PEARLS PO             Allergies  No Known Allergies    DIET  Orders Placed This Encounter   Procedures   • Diet Order Regular     Standing Status:   Standing     Number of Occurrences:   1     Order Specific Question:   Diet:     Answer:   Regular [1]       ACTIVITY  As tolerated.  Weight bearing as tolerated    LINES, DRAINS, AND WOUNDS  This is an automated list. Peripheral IVs will be removed prior to discharge.  Peripheral IV 02/08/20 18 G Right Antecubital (Active)   Site Assessment Dry;Intact 2/13/2020  8:00 AM   Dressing Type Transparent 2/13/2020  8:00 AM   Line Status Saline locked 2/13/2020  8:00 AM   Dressing Status Intact 2/13/2020  8:00 AM   Dressing Intervention N/A 2/13/2020  8:00 AM   Date Primary Tubing Changed 02/08/20 2/10/2020  9:00 PM   Date Secondary Tubing Changed 02/09/20 2/9/2020  8:30 PM   NEXT Primary Tubing Change  02/11/20 2/10/2020  9:00 PM   NEXT Secondary Tubing Change  02/10/20 2/9/2020  8:30 PM   Infiltration Grading ([facility], [facility]) 0 2/13/2020  8:00 AM   Phlebitis Scale ([facility] Only) 0 2/13/2020  8:00 AM          Peripheral IV 02/08/20 18 G Right Antecubital (Active)   Site Assessment Dry;Intact 2/13/2020  8:00 AM   Dressing Type Transparent 2/13/2020  8:00 AM   Line Status Saline locked 2/13/2020  8:00 AM   Dressing Status Intact 2/13/2020  8:00 AM   Dressing Intervention N/A 2/13/2020  8:00 AM   Date Primary Tubing Changed 02/08/20 2/10/2020  9:00 PM   Date Secondary Tubing Changed 02/09/20 2/9/2020  8:30 PM   NEXT Primary Tubing Change  02/11/20 2/10/2020  9:00 PM   NEXT Secondary Tubing Change  02/10/20 2/9/2020  8:30 PM   Infiltration Grading ([facility]) 0 2/13/2020  8:00 AM   Phlebitis Scale ([facility] Only) 0 2/13/2020  8:00 AM               MENTAL STATUS ON TRANSFER  Level of Consciousness: Confused  Orientation : Disoriented to Place, Disoriented to Time  Speech: Speech Clear    CONSULTATIONS  Ortho      PROCEDURES  None     LABORATORY  Lab Results   Component Value Date    SODIUM 134  (L) 02/12/2020    POTASSIUM 4.2 02/12/2020    CHLORIDE 102 02/12/2020    CO2 28 02/12/2020    GLUCOSE 126 (H) 02/12/2020    BUN 13 02/12/2020    CREATININE 0.54 02/12/2020        Lab Results   Component Value Date    WBC 11.5 (H) 02/13/2020    HEMOGLOBIN 9.6 (L) 02/13/2020    HEMATOCRIT 28.6 (L) 02/13/2020    PLATELETCT 206 02/13/2020        Total time of the discharge process exceeds 41  minutes.

## 2020-02-13 NOTE — CARE PLAN
Problem: Safety  Goal: Will remain free from falls  Outcome: PROGRESSING AS EXPECTED  Patient high fall risk, safety interventions in place. Bed in locked and lowest position, call light within reach.     Problem: Bowel/Gastric:  Goal: Normal bowel function is maintained or improved  Outcome: PROGRESSING AS EXPECTED  Patient having normal bowel movements.     Problem: Discharge Barriers/Planning  Goal: Patient's continuum of care needs will be met  Outcome: PROGRESSING AS EXPECTED   Patient anticipating discharging to [facility] today.

## 2020-02-13 NOTE — PROGRESS NOTES
DATE OF SERVICE:    TIME:  Approximately 11:40 a.m.    SUBJECTIVE:  The patient is sleeping.  I did not wake her up.  She is getting   a transfusion at the time I came by.    OBJECTIVE:  VITAL SIGNS:  Blood pressure 113/57, heart rate 81, respirations 17,   temperature 98.3.    LABORATORY DATA:  Hematocrit this morning was 19.5%.  Platelet count 156,000.    Sodium 134, potassium 4.2, creatinine 0.54.    ASSESSMENT:  1.  Left intertrochanteric/subtrochanteric femur fracture status post   intramedullary nailing.  2.  Acute blood loss anemia.    PLAN:  I agree with the transfusions.  She can mobilize with therapy tomorrow   once her H and H is back to a better range.  She will need discharge planning.    We would recommend holding Lovenox for 24 hours.       ____________________________________     MD ALEXIS QUINN / KHUSHBU    DD:  02/12/2020 14:53:30  DT:  02/13/2020 00:20:30    D#:  [id number]  Job#:  [id number]

## 2020-02-13 NOTE — PROGRESS NOTES
Orthopaedic Progress Note    Interval changes:  Patient doing well  Dressings changed incisions without issue    ROS - Patient denies any new issues.  Pain well controlled.    /67   Pulse 89   Temp 36.4 °C (97.6 °F) (Temporal)   Resp 19   Ht 1.524 m (5')   Wt 54 kg (119 lb)   SpO2 93%       Patient seen and examined  No acute distress  Breathing non labored  RRR  LLE surgical dressings with min sanguinous, dressings changed. Patient clearly fires tibialis anterior, EHL, and gastrocnemius/soleus. Sensation is intact to light touch throughout superficial peroneal, deep peroneal, tibial, saphenous, and sural nerve distributions. Strong and palpable 2+ dorsalis pedis and posterior tibial pulses with capillary refill less than 2 seconds. No lower leg tenderness or discomfort.       Recent Labs     02/10/20  0947 02/11/20  0147 02/12/20  0239   WBC 18.9* 13.5* 10.6   RBC 2.77* 2.22* 2.05*   HEMOGLOBIN 8.5* 7.0* 6.5*   HEMATOCRIT 26.5* 21.6* 19.5*   MCV 95.7 97.3 95.1   MCH 30.7 31.5 31.7   MCHC 32.1* 32.4* 33.3*   RDW 48.2 48.7 46.5   PLATELETCT 160* 143* 156*   MPV 9.9 9.9 9.6       Active Hospital Problems    Diagnosis   • Anemia [D64.9]   • History of stroke [Z86.73]   • Femur fracture (HCC) [S72.90XA]   • Spinal stenosis of lumbar region [M48.061]   • Fall [W19.XXXA]       Assessment/Plan:  Cleared for DC to SNF by ortho pending medicine clearance   POD#3 S/P Intramedullary nailing of left hip  Wt bearing status - WBAT  Wound care/Drains - dressings changed every other day   Future Procedures - none planned   Sutures/Staples out- 10-14 days post operatively  PT/OT-initiated  Antibiotics: completed  DVT Prophylaxis- TEDS/SCDs/Foot pumps  Mims-none  Case Coordination for Discharge Planning - Disposition SNF

## 2020-02-13 NOTE — PROGRESS NOTES
Patient IV access removed. Reviewed discharge paperwork and follow-up appointment with patient and daughter Jane. All personal [facility]. Report given to RNGilbert.

## 2020-02-13 NOTE — PROGRESS NOTES
During linen change and cleaning of patient, RN and CNA noted generalized bruising from left flank, left hip, left inner thigh to left labia.

## 2020-02-13 NOTE — DISCHARGE PLANNING
MD confirmed w/ MELODYW that pt is medically cleared to dc.  MELODYW contacted [facility] and confirmed that they will be here today to transport the pt back to their facility at 11am.

## 2020-02-13 NOTE — CARE PLAN
Problem: Safety  Goal: Will remain free from falls  Outcome: PROGRESSING AS EXPECTED   Patient educated on safety. Call light in reach. Bed alarms on. Will continue to monitor.    Problem: Bowel/Gastric:  Goal: Normal bowel function is maintained or improved  Outcome: PROGRESSING AS EXPECTED   Patient has small, loose BM this evening and small void. Skin hygiene complete. Linen changed.

## 2020-02-14 ENCOUNTER — HOME CARE VISIT (OUTPATIENT)
Dept: HOSPICE | Facility: HOSPICE | Age: 85
End: 2020-02-14
Payer: MEDICARE

## 2020-02-14 VITALS — RESPIRATION RATE: 18 BRPM | DIASTOLIC BLOOD PRESSURE: 76 MMHG | HEART RATE: 76 BPM | SYSTOLIC BLOOD PRESSURE: 124 MMHG

## 2020-02-14 PROCEDURE — G0299 HHS/HOSPICE OF RN EA 15 MIN: HCPCS

## 2020-02-14 PROCEDURE — S9126 HOSPICE CARE, IN THE HOME, P: HCPCS

## 2020-02-14 ASSESSMENT — ENCOUNTER SYMPTOMS
DYSPNEA ACTIVITY LEVEL: AT REST
FATIGUE: 1
DESCRIPTION OF MEMORY LOSS: SHORT TERM
SHORTNESS OF BREATH: 1
SLEEP QUALITY: FAIR
FORGETFULNESS: 1

## 2020-02-15 ENCOUNTER — HOME CARE VISIT (OUTPATIENT)
Dept: HOSPICE | Facility: HOSPICE | Age: 85
End: 2020-02-15
Payer: MEDICARE

## 2020-02-15 PROCEDURE — S9126 HOSPICE CARE, IN THE HOME, P: HCPCS

## 2020-02-15 PROCEDURE — G0156 HHCP-SVS OF AIDE,EA 15 MIN: HCPCS

## 2020-02-16 PROCEDURE — S9126 HOSPICE CARE, IN THE HOME, P: HCPCS

## 2020-02-17 ENCOUNTER — HOME CARE VISIT (OUTPATIENT)
Dept: HOSPICE | Facility: HOSPICE | Age: 85
End: 2020-02-17
Payer: MEDICARE

## 2020-02-17 VITALS
HEART RATE: 84 BPM | SYSTOLIC BLOOD PRESSURE: 132 MMHG | DIASTOLIC BLOOD PRESSURE: 60 MMHG | TEMPERATURE: 98.1 F | RESPIRATION RATE: 18 BRPM | OXYGEN SATURATION: 80 %

## 2020-02-17 PROCEDURE — G0155 HHCP-SVS OF CSW,EA 15 MIN: HCPCS

## 2020-02-17 PROCEDURE — S9126 HOSPICE CARE, IN THE HOME, P: HCPCS

## 2020-02-17 PROCEDURE — G0299 HHS/HOSPICE OF RN EA 15 MIN: HCPCS

## 2020-02-17 SDOH — ECONOMIC STABILITY: HOUSING INSECURITY: EVIDENCE OF SMOKING MATERIAL: 0

## 2020-02-17 SDOH — ECONOMIC STABILITY: HOUSING INSECURITY: HOME SAFETY: O2 SAFETY SIGN ON RIGHT DOOR TRIM. 4 O2 TANKS IN CLOSET IN TANK RACK.

## 2020-02-17 ASSESSMENT — ENCOUNTER SYMPTOMS
FORGETFULNESS: 1
STOOL FREQUENCY: LESS THAN DAILY
SLEEP QUALITY: FAIR
LAST BOWEL MOVEMENT: 65423
FATIGUE: 1
FATIGUES EASILY: 1
CONSTIPATION: 1
SLEEP QUALITY: FAIR
DESCRIPTION OF MEMORY LOSS: SHORT TERM

## 2020-02-17 ASSESSMENT — SOCIAL DETERMINANTS OF HEALTH (SDOH)
ACTIVE STRESSOR - HEALTH CHANGES: 1
ACTIVE STRESSOR - NO STRESS FACTORS: 1

## 2020-02-18 PROCEDURE — 6650990 HC HOSPICE AND HOME CARE RX REV CODE 0250

## 2020-02-18 PROCEDURE — S9126 HOSPICE CARE, IN THE HOME, P: HCPCS

## 2020-02-19 ENCOUNTER — HOME CARE VISIT (OUTPATIENT)
Dept: HOSPICE | Facility: HOSPICE | Age: 85
End: 2020-02-19
Payer: MEDICARE

## 2020-02-19 PROCEDURE — G0156 HHCP-SVS OF AIDE,EA 15 MIN: HCPCS

## 2020-02-19 PROCEDURE — S9126 HOSPICE CARE, IN THE HOME, P: HCPCS

## 2020-02-20 ENCOUNTER — HOME CARE VISIT (OUTPATIENT)
Dept: HOSPICE | Facility: HOSPICE | Age: 85
End: 2020-02-20
Payer: MEDICARE

## 2020-02-20 PROCEDURE — G0299 HHS/HOSPICE OF RN EA 15 MIN: HCPCS

## 2020-02-20 PROCEDURE — S9126 HOSPICE CARE, IN THE HOME, P: HCPCS

## 2020-02-20 SDOH — ECONOMIC STABILITY: HOUSING INSECURITY: EVIDENCE OF SMOKING MATERIAL: 0

## 2020-02-20 ASSESSMENT — SOCIAL DETERMINANTS OF HEALTH (SDOH): ACTIVE STRESSOR - HEALTH CHANGES: 1

## 2020-02-20 ASSESSMENT — ENCOUNTER SYMPTOMS
STOOL FREQUENCY: LESS THAN DAILY
LAST BOWEL MOVEMENT: 65428
CONSTIPATION: 1
SLEEP QUALITY: FAIR

## 2020-02-21 ENCOUNTER — HOME CARE VISIT (OUTPATIENT)
Dept: HOSPICE | Facility: HOSPICE | Age: 85
End: 2020-02-21
Payer: MEDICARE

## 2020-02-21 VITALS
OXYGEN SATURATION: 89 % | RESPIRATION RATE: 24 BRPM | SYSTOLIC BLOOD PRESSURE: 122 MMHG | DIASTOLIC BLOOD PRESSURE: 58 MMHG | HEART RATE: 78 BPM | TEMPERATURE: 98.6 F

## 2020-02-21 PROCEDURE — S9126 HOSPICE CARE, IN THE HOME, P: HCPCS

## 2020-02-21 PROCEDURE — G0156 HHCP-SVS OF AIDE,EA 15 MIN: HCPCS

## 2020-02-21 PROCEDURE — G0299 HHS/HOSPICE OF RN EA 15 MIN: HCPCS

## 2020-02-21 ASSESSMENT — ENCOUNTER SYMPTOMS
SLEEP QUALITY: FAIR
LAST BOWEL MOVEMENT: 65430
STOOL FREQUENCY: LESS THAN DAILY

## 2020-02-21 ASSESSMENT — SOCIAL DETERMINANTS OF HEALTH (SDOH): ACTIVE STRESSOR - HEALTH CHANGES: 1

## 2020-02-22 PROCEDURE — S9126 HOSPICE CARE, IN THE HOME, P: HCPCS

## 2020-02-23 PROCEDURE — S9126 HOSPICE CARE, IN THE HOME, P: HCPCS

## 2020-02-24 ENCOUNTER — HOME CARE VISIT (OUTPATIENT)
Dept: HOSPICE | Facility: HOSPICE | Age: 85
End: 2020-02-24
Payer: MEDICARE

## 2020-02-24 VITALS
TEMPERATURE: 98.1 F | DIASTOLIC BLOOD PRESSURE: 80 MMHG | RESPIRATION RATE: 28 BRPM | OXYGEN SATURATION: 96 % | SYSTOLIC BLOOD PRESSURE: 140 MMHG | HEART RATE: 61 BPM

## 2020-02-24 PROCEDURE — S9126 HOSPICE CARE, IN THE HOME, P: HCPCS

## 2020-02-24 PROCEDURE — A6252 ABSORPT DRG >16 <=48 W/O BDR: HCPCS

## 2020-02-24 PROCEDURE — G0299 HHS/HOSPICE OF RN EA 15 MIN: HCPCS

## 2020-02-24 PROCEDURE — G0156 HHCP-SVS OF AIDE,EA 15 MIN: HCPCS

## 2020-02-24 SDOH — ECONOMIC STABILITY: HOUSING INSECURITY: EVIDENCE OF SMOKING MATERIAL: 0

## 2020-02-24 ASSESSMENT — ENCOUNTER SYMPTOMS
FORGETFULNESS: 1
FATIGUE: 1
LAST BOWEL MOVEMENT: 65432
DESCRIPTION OF MEMORY LOSS: SHORT TERM
STOOL FREQUENCY: LESS THAN DAILY
FATIGUES EASILY: 1

## 2020-02-25 PROCEDURE — S9126 HOSPICE CARE, IN THE HOME, P: HCPCS

## 2020-02-26 ENCOUNTER — HOME CARE VISIT (OUTPATIENT)
Dept: HOSPICE | Facility: HOSPICE | Age: 85
End: 2020-02-26
Payer: MEDICARE

## 2020-02-26 PROCEDURE — S9126 HOSPICE CARE, IN THE HOME, P: HCPCS

## 2020-02-27 PROCEDURE — S9126 HOSPICE CARE, IN THE HOME, P: HCPCS

## 2020-02-28 ENCOUNTER — HOME CARE VISIT (OUTPATIENT)
Dept: HOSPICE | Facility: HOSPICE | Age: 85
End: 2020-02-28
Payer: MEDICARE

## 2020-02-28 PROCEDURE — G0299 HHS/HOSPICE OF RN EA 15 MIN: HCPCS

## 2020-02-28 PROCEDURE — S9126 HOSPICE CARE, IN THE HOME, P: HCPCS

## 2020-02-28 PROCEDURE — 6650990 HC HOSPICE AND HOME CARE RX REV CODE 0250

## 2020-02-28 PROCEDURE — G0156 HHCP-SVS OF AIDE,EA 15 MIN: HCPCS

## 2020-02-28 ASSESSMENT — ENCOUNTER SYMPTOMS
LAST BOWEL MOVEMENT: 65437
STOOL FREQUENCY: LESS THAN DAILY

## 2020-02-29 PROCEDURE — S9126 HOSPICE CARE, IN THE HOME, P: HCPCS

## 2020-03-01 PROCEDURE — S9126 HOSPICE CARE, IN THE HOME, P: HCPCS

## 2020-03-02 ENCOUNTER — HOME CARE VISIT (OUTPATIENT)
Dept: HOSPICE | Facility: HOSPICE | Age: 85
End: 2020-03-02
Payer: MEDICARE

## 2020-03-02 VITALS — RESPIRATION RATE: 24 BRPM | HEART RATE: 80 BPM

## 2020-03-02 PROCEDURE — S9126 HOSPICE CARE, IN THE HOME, P: HCPCS

## 2020-03-02 PROCEDURE — G0299 HHS/HOSPICE OF RN EA 15 MIN: HCPCS

## 2020-03-02 SDOH — ECONOMIC STABILITY: HOUSING INSECURITY: EVIDENCE OF SMOKING MATERIAL: 0

## 2020-03-02 ASSESSMENT — ENCOUNTER SYMPTOMS
LAST BOWEL MOVEMENT: 65440
STOOL FREQUENCY: LESS THAN DAILY

## 2020-03-03 ENCOUNTER — HOME CARE VISIT (OUTPATIENT)
Dept: HOSPICE | Facility: HOSPICE | Age: 85
End: 2020-03-03
Payer: MEDICARE

## 2020-03-03 PROCEDURE — G0156 HHCP-SVS OF AIDE,EA 15 MIN: HCPCS

## 2020-03-03 PROCEDURE — S9126 HOSPICE CARE, IN THE HOME, P: HCPCS

## 2020-03-04 PROCEDURE — S9126 HOSPICE CARE, IN THE HOME, P: HCPCS

## 2020-03-05 ENCOUNTER — HOME CARE VISIT (OUTPATIENT)
Dept: HOSPICE | Facility: HOSPICE | Age: 85
End: 2020-03-05
Payer: MEDICARE

## 2020-03-05 PROCEDURE — S9126 HOSPICE CARE, IN THE HOME, P: HCPCS

## 2020-03-06 ENCOUNTER — HOME CARE VISIT (OUTPATIENT)
Dept: HOSPICE | Facility: HOSPICE | Age: 85
End: 2020-03-06
Payer: MEDICARE

## 2020-03-06 VITALS
RESPIRATION RATE: 22 BRPM | TEMPERATURE: 98 F | OXYGEN SATURATION: 89 % | DIASTOLIC BLOOD PRESSURE: 64 MMHG | HEART RATE: 88 BPM | SYSTOLIC BLOOD PRESSURE: 118 MMHG

## 2020-03-06 PROCEDURE — G0156 HHCP-SVS OF AIDE,EA 15 MIN: HCPCS

## 2020-03-06 PROCEDURE — G0299 HHS/HOSPICE OF RN EA 15 MIN: HCPCS

## 2020-03-06 PROCEDURE — S9126 HOSPICE CARE, IN THE HOME, P: HCPCS

## 2020-03-06 ASSESSMENT — ENCOUNTER SYMPTOMS
STOOL FREQUENCY: LESS THAN DAILY
LAST BOWEL MOVEMENT: 65440
FATIGUES EASILY: 1

## 2020-03-07 PROCEDURE — S9126 HOSPICE CARE, IN THE HOME, P: HCPCS

## 2020-03-08 PROCEDURE — S9126 HOSPICE CARE, IN THE HOME, P: HCPCS

## 2020-03-09 ENCOUNTER — HOME CARE VISIT (OUTPATIENT)
Dept: HOSPICE | Facility: HOSPICE | Age: 85
End: 2020-03-09
Payer: MEDICARE

## 2020-03-09 PROCEDURE — S9126 HOSPICE CARE, IN THE HOME, P: HCPCS

## 2020-03-09 PROCEDURE — G0299 HHS/HOSPICE OF RN EA 15 MIN: HCPCS

## 2020-03-09 SDOH — ECONOMIC STABILITY: HOUSING INSECURITY: EVIDENCE OF SMOKING MATERIAL: 0

## 2020-03-10 ENCOUNTER — HOME CARE VISIT (OUTPATIENT)
Dept: HOSPICE | Facility: HOSPICE | Age: 85
End: 2020-03-10
Payer: MEDICARE

## 2020-03-10 PROCEDURE — S9126 HOSPICE CARE, IN THE HOME, P: HCPCS

## 2020-03-10 PROCEDURE — G0156 HHCP-SVS OF AIDE,EA 15 MIN: HCPCS

## 2020-03-10 ASSESSMENT — ENCOUNTER SYMPTOMS
STOOL FREQUENCY: LESS THAN DAILY
LAST BOWEL MOVEMENT: 65448
FATIGUE: 1

## 2020-03-11 ENCOUNTER — HOME CARE VISIT (OUTPATIENT)
Dept: HOSPICE | Facility: HOSPICE | Age: 85
End: 2020-03-11
Payer: MEDICARE

## 2020-03-11 VITALS
OXYGEN SATURATION: 89 % | HEART RATE: 82 BPM | RESPIRATION RATE: 24 BRPM | TEMPERATURE: 98.3 F | SYSTOLIC BLOOD PRESSURE: 122 MMHG | DIASTOLIC BLOOD PRESSURE: 80 MMHG

## 2020-03-11 PROCEDURE — G0299 HHS/HOSPICE OF RN EA 15 MIN: HCPCS

## 2020-03-11 PROCEDURE — S9126 HOSPICE CARE, IN THE HOME, P: HCPCS

## 2020-03-11 SDOH — ECONOMIC STABILITY: HOUSING INSECURITY: EVIDENCE OF SMOKING MATERIAL: 0

## 2020-03-11 ASSESSMENT — ENCOUNTER SYMPTOMS
STOOL FREQUENCY: DAILY
DESCRIPTION OF MEMORY LOSS: SHORT TERM
FATIGUE: 1
FATIGUES EASILY: 1
SLEEP QUALITY: POOR
LAST BOWEL MOVEMENT: 65449
CONSTIPATION: 1
FORGETFULNESS: 1

## 2020-03-11 ASSESSMENT — SOCIAL DETERMINANTS OF HEALTH (SDOH)
ACTIVE STRESSOR - EXHAUSTION: 1
ACTIVE STRESSOR - LOSS OF CONTROL: 1
ACTIVE STRESSOR - HEALTH CHANGES: 1

## 2020-03-12 PROCEDURE — 6650990 HC HOSPICE AND HOME CARE RX REV CODE 0250

## 2020-03-12 PROCEDURE — S9126 HOSPICE CARE, IN THE HOME, P: HCPCS

## 2020-03-13 ENCOUNTER — HOME CARE VISIT (OUTPATIENT)
Dept: HOSPICE | Facility: HOSPICE | Age: 85
End: 2020-03-13
Payer: MEDICARE

## 2020-03-13 PROCEDURE — S9126 HOSPICE CARE, IN THE HOME, P: HCPCS

## 2020-03-14 PROCEDURE — S9126 HOSPICE CARE, IN THE HOME, P: HCPCS

## 2020-03-15 PROCEDURE — S9126 HOSPICE CARE, IN THE HOME, P: HCPCS

## 2020-03-16 ENCOUNTER — HOME CARE VISIT (OUTPATIENT)
Dept: HOSPICE | Facility: HOSPICE | Age: 85
End: 2020-03-16
Payer: MEDICARE

## 2020-03-16 VITALS
SYSTOLIC BLOOD PRESSURE: 122 MMHG | HEART RATE: 90 BPM | RESPIRATION RATE: 24 BRPM | DIASTOLIC BLOOD PRESSURE: 66 MMHG | TEMPERATURE: 98.6 F | OXYGEN SATURATION: 94 %

## 2020-03-16 PROCEDURE — G0299 HHS/HOSPICE OF RN EA 15 MIN: HCPCS

## 2020-03-16 PROCEDURE — S9126 HOSPICE CARE, IN THE HOME, P: HCPCS

## 2020-03-16 SDOH — ECONOMIC STABILITY: HOUSING INSECURITY: EVIDENCE OF SMOKING MATERIAL: 0

## 2020-03-16 ASSESSMENT — ENCOUNTER SYMPTOMS
LAST BOWEL MOVEMENT: 65454
BOWEL PATTERN NORMAL: 1
SLEEP QUALITY: FAIR
STOOL FREQUENCY: DAILY

## 2020-03-16 ASSESSMENT — SOCIAL DETERMINANTS OF HEALTH (SDOH): ACTIVE STRESSOR - HEALTH CHANGES: 1

## 2020-03-17 ENCOUNTER — HOME CARE VISIT (OUTPATIENT)
Dept: HOSPICE | Facility: HOSPICE | Age: 85
End: 2020-03-17
Payer: MEDICARE

## 2020-03-17 PROCEDURE — 6650990 HC HOSPICE AND HOME CARE RX REV CODE 0250

## 2020-03-17 PROCEDURE — S9126 HOSPICE CARE, IN THE HOME, P: HCPCS

## 2020-03-18 PROCEDURE — S9126 HOSPICE CARE, IN THE HOME, P: HCPCS

## 2020-03-19 ENCOUNTER — HOME CARE VISIT (OUTPATIENT)
Dept: HOSPICE | Facility: HOSPICE | Age: 85
End: 2020-03-19
Payer: MEDICARE

## 2020-03-19 PROCEDURE — S9126 HOSPICE CARE, IN THE HOME, P: HCPCS

## 2020-03-20 PROCEDURE — S9126 HOSPICE CARE, IN THE HOME, P: HCPCS

## 2020-03-21 PROCEDURE — S9126 HOSPICE CARE, IN THE HOME, P: HCPCS

## 2020-03-22 PROCEDURE — S9126 HOSPICE CARE, IN THE HOME, P: HCPCS

## 2020-03-23 PROCEDURE — S9126 HOSPICE CARE, IN THE HOME, P: HCPCS

## 2020-03-24 PROCEDURE — S9126 HOSPICE CARE, IN THE HOME, P: HCPCS

## 2020-03-25 ENCOUNTER — HOME CARE VISIT (OUTPATIENT)
Dept: HOSPICE | Facility: HOSPICE | Age: 85
End: 2020-03-25
Payer: MEDICARE

## 2020-03-25 PROCEDURE — 6650990 HC HOSPICE AND HOME CARE RX REV CODE 0250

## 2020-03-25 PROCEDURE — G0299 HHS/HOSPICE OF RN EA 15 MIN: HCPCS

## 2020-03-25 PROCEDURE — S9126 HOSPICE CARE, IN THE HOME, P: HCPCS

## 2020-03-26 VITALS — DIASTOLIC BLOOD PRESSURE: 60 MMHG | HEART RATE: 72 BPM | RESPIRATION RATE: 18 BRPM | SYSTOLIC BLOOD PRESSURE: 120 MMHG

## 2020-03-26 PROCEDURE — S9126 HOSPICE CARE, IN THE HOME, P: HCPCS

## 2020-03-26 ASSESSMENT — ENCOUNTER SYMPTOMS
LAST BOWEL MOVEMENT: 65463
SLEEP QUALITY: FAIR
BOWEL PATTERN NORMAL: 1
STOOL FREQUENCY: DAILY
FATIGUES EASILY: 1
FORGETFULNESS: 1
DESCRIPTION OF MEMORY LOSS: SHORT TERM
FATIGUE: 1

## 2020-03-26 ASSESSMENT — ACTIVITIES OF DAILY LIVING (ADL): MONEY MANAGEMENT (EXPENSES/BILLS): TOTALLY DEPENDENT

## 2020-03-26 ASSESSMENT — SOCIAL DETERMINANTS OF HEALTH (SDOH): ACTIVE STRESSOR - HEALTH CHANGES: 1

## 2020-03-27 PROCEDURE — S9126 HOSPICE CARE, IN THE HOME, P: HCPCS

## 2020-03-28 PROCEDURE — S9126 HOSPICE CARE, IN THE HOME, P: HCPCS

## 2020-03-29 PROCEDURE — S9126 HOSPICE CARE, IN THE HOME, P: HCPCS

## 2020-03-30 PROCEDURE — S9126 HOSPICE CARE, IN THE HOME, P: HCPCS

## 2020-03-31 PROCEDURE — S9126 HOSPICE CARE, IN THE HOME, P: HCPCS

## 2020-04-01 PROCEDURE — S9126 HOSPICE CARE, IN THE HOME, P: HCPCS

## 2020-04-02 ENCOUNTER — HOME CARE VISIT (OUTPATIENT)
Dept: HOSPICE | Facility: HOSPICE | Age: 85
End: 2020-04-02
Payer: MEDICARE

## 2020-04-02 PROCEDURE — S9126 HOSPICE CARE, IN THE HOME, P: HCPCS

## 2020-04-03 ENCOUNTER — HOME CARE VISIT (OUTPATIENT)
Dept: HOSPICE | Facility: HOSPICE | Age: 85
End: 2020-04-03
Payer: MEDICARE

## 2020-04-03 VITALS
RESPIRATION RATE: 22 BRPM | DIASTOLIC BLOOD PRESSURE: 50 MMHG | TEMPERATURE: 98.4 F | OXYGEN SATURATION: 90 % | SYSTOLIC BLOOD PRESSURE: 124 MMHG | HEART RATE: 68 BPM

## 2020-04-03 PROCEDURE — G0299 HHS/HOSPICE OF RN EA 15 MIN: HCPCS

## 2020-04-03 PROCEDURE — S9126 HOSPICE CARE, IN THE HOME, P: HCPCS

## 2020-04-03 SDOH — ECONOMIC STABILITY: HOUSING INSECURITY: EVIDENCE OF SMOKING MATERIAL: 0

## 2020-04-03 ASSESSMENT — ENCOUNTER SYMPTOMS
SLEEP QUALITY: FAIR
LAST BOWEL MOVEMENT: 65470
STOOL FREQUENCY: DAILY
BOWEL PATTERN NORMAL: 1

## 2020-04-03 ASSESSMENT — SOCIAL DETERMINANTS OF HEALTH (SDOH): ACTIVE STRESSOR - HEALTH CHANGES: 1

## 2020-04-04 PROCEDURE — S9126 HOSPICE CARE, IN THE HOME, P: HCPCS

## 2020-04-05 PROCEDURE — S9126 HOSPICE CARE, IN THE HOME, P: HCPCS

## 2020-04-06 ENCOUNTER — HOME CARE VISIT (OUTPATIENT)
Dept: HOSPICE | Facility: HOSPICE | Age: 85
End: 2020-04-06
Payer: MEDICARE

## 2020-04-06 PROCEDURE — S9126 HOSPICE CARE, IN THE HOME, P: HCPCS

## 2020-04-07 PROCEDURE — S9126 HOSPICE CARE, IN THE HOME, P: HCPCS

## 2020-04-08 PROCEDURE — S9126 HOSPICE CARE, IN THE HOME, P: HCPCS

## 2020-04-09 ENCOUNTER — HOME CARE VISIT (OUTPATIENT)
Dept: HOSPICE | Facility: HOSPICE | Age: 85
End: 2020-04-09
Payer: MEDICARE

## 2020-04-09 VITALS
HEART RATE: 92 BPM | TEMPERATURE: 97.5 F | RESPIRATION RATE: 20 BRPM | OXYGEN SATURATION: 100 % | DIASTOLIC BLOOD PRESSURE: 80 MMHG | SYSTOLIC BLOOD PRESSURE: 130 MMHG

## 2020-04-09 PROCEDURE — S9126 HOSPICE CARE, IN THE HOME, P: HCPCS

## 2020-04-09 PROCEDURE — G0299 HHS/HOSPICE OF RN EA 15 MIN: HCPCS

## 2020-04-09 SDOH — ECONOMIC STABILITY: HOUSING INSECURITY: EVIDENCE OF SMOKING MATERIAL: 0

## 2020-04-09 SDOH — ECONOMIC STABILITY: HOUSING INSECURITY

## 2020-04-09 ASSESSMENT — ENCOUNTER SYMPTOMS
STOOL FREQUENCY: DAILY
LAST BOWEL MOVEMENT: 65477
BOWEL PATTERN NORMAL: 1
SLEEP QUALITY: FAIR

## 2020-04-09 ASSESSMENT — SOCIAL DETERMINANTS OF HEALTH (SDOH): ACTIVE STRESSOR - LOSS OF CONTROL: 1

## 2020-04-10 PROCEDURE — S9126 HOSPICE CARE, IN THE HOME, P: HCPCS

## 2020-04-11 PROCEDURE — S9126 HOSPICE CARE, IN THE HOME, P: HCPCS

## 2020-04-12 PROCEDURE — S9126 HOSPICE CARE, IN THE HOME, P: HCPCS

## 2020-04-13 ENCOUNTER — HOME CARE VISIT (OUTPATIENT)
Dept: HOSPICE | Facility: HOSPICE | Age: 85
End: 2020-04-13
Payer: MEDICARE

## 2020-04-13 PROCEDURE — S9126 HOSPICE CARE, IN THE HOME, P: HCPCS

## 2020-04-14 PROCEDURE — S9126 HOSPICE CARE, IN THE HOME, P: HCPCS

## 2020-04-15 PROCEDURE — S9126 HOSPICE CARE, IN THE HOME, P: HCPCS

## 2020-04-16 PROCEDURE — S9126 HOSPICE CARE, IN THE HOME, P: HCPCS

## 2020-04-17 ENCOUNTER — HOME CARE VISIT (OUTPATIENT)
Dept: HOSPICE | Facility: HOSPICE | Age: 85
End: 2020-04-17
Payer: MEDICARE

## 2020-04-17 VITALS
TEMPERATURE: 98.1 F | HEART RATE: 72 BPM | DIASTOLIC BLOOD PRESSURE: 56 MMHG | SYSTOLIC BLOOD PRESSURE: 120 MMHG | RESPIRATION RATE: 18 BRPM | OXYGEN SATURATION: 94 %

## 2020-04-17 PROCEDURE — G0299 HHS/HOSPICE OF RN EA 15 MIN: HCPCS

## 2020-04-17 PROCEDURE — S9126 HOSPICE CARE, IN THE HOME, P: HCPCS

## 2020-04-17 SDOH — ECONOMIC STABILITY: HOUSING INSECURITY: EVIDENCE OF SMOKING MATERIAL: 0

## 2020-04-17 ASSESSMENT — SOCIAL DETERMINANTS OF HEALTH (SDOH): ACTIVE STRESSOR - LOSS OF CONTROL: 1

## 2020-04-17 ASSESSMENT — ENCOUNTER SYMPTOMS
SLEEP QUALITY: FAIR
BOWEL PATTERN NORMAL: 1
STOOL FREQUENCY: DAILY
LAST BOWEL MOVEMENT: 65486

## 2020-04-18 PROCEDURE — S9126 HOSPICE CARE, IN THE HOME, P: HCPCS

## 2020-04-19 PROCEDURE — S9126 HOSPICE CARE, IN THE HOME, P: HCPCS

## 2020-04-20 PROCEDURE — 6650990 HC HOSPICE AND HOME CARE RX REV CODE 0250

## 2020-04-20 PROCEDURE — S9126 HOSPICE CARE, IN THE HOME, P: HCPCS

## 2020-04-21 PROCEDURE — S9126 HOSPICE CARE, IN THE HOME, P: HCPCS

## 2020-04-22 PROCEDURE — S9126 HOSPICE CARE, IN THE HOME, P: HCPCS

## 2020-04-23 PROCEDURE — S9126 HOSPICE CARE, IN THE HOME, P: HCPCS

## 2020-04-24 ENCOUNTER — HOME CARE VISIT (OUTPATIENT)
Dept: HOSPICE | Facility: HOSPICE | Age: 85
End: 2020-04-24
Payer: MEDICARE

## 2020-04-24 PROCEDURE — S9126 HOSPICE CARE, IN THE HOME, P: HCPCS

## 2020-04-24 PROCEDURE — G0299 HHS/HOSPICE OF RN EA 15 MIN: HCPCS

## 2020-04-25 PROCEDURE — S9126 HOSPICE CARE, IN THE HOME, P: HCPCS

## 2020-04-26 VITALS
RESPIRATION RATE: 24 BRPM | DIASTOLIC BLOOD PRESSURE: 68 MMHG | HEART RATE: 68 BPM | OXYGEN SATURATION: 97 % | SYSTOLIC BLOOD PRESSURE: 128 MMHG | TEMPERATURE: 98 F

## 2020-04-26 PROCEDURE — S9126 HOSPICE CARE, IN THE HOME, P: HCPCS

## 2020-04-26 SDOH — ECONOMIC STABILITY: HOUSING INSECURITY: EVIDENCE OF SMOKING MATERIAL: 0

## 2020-04-26 ASSESSMENT — ENCOUNTER SYMPTOMS
LAST BOWEL MOVEMENT: 65492
SLEEP QUALITY: FAIR
STOOL FREQUENCY: DAILY
BOWEL PATTERN NORMAL: 1

## 2020-04-26 ASSESSMENT — SOCIAL DETERMINANTS OF HEALTH (SDOH): ACTIVE STRESSOR - LOSS OF CONTROL: 1

## 2020-04-27 PROCEDURE — S9126 HOSPICE CARE, IN THE HOME, P: HCPCS

## 2020-04-28 PROCEDURE — S9126 HOSPICE CARE, IN THE HOME, P: HCPCS

## 2020-04-29 PROCEDURE — S9126 HOSPICE CARE, IN THE HOME, P: HCPCS

## 2020-04-30 PROCEDURE — 6650990 HC HOSPICE AND HOME CARE RX REV CODE 0250

## 2020-04-30 PROCEDURE — S9126 HOSPICE CARE, IN THE HOME, P: HCPCS

## 2020-05-01 ENCOUNTER — HOME CARE VISIT (OUTPATIENT)
Dept: HOSPICE | Facility: HOSPICE | Age: 85
End: 2020-05-01
Payer: MEDICARE

## 2020-05-01 VITALS
DIASTOLIC BLOOD PRESSURE: 68 MMHG | OXYGEN SATURATION: 96 % | RESPIRATION RATE: 22 BRPM | TEMPERATURE: 97.9 F | SYSTOLIC BLOOD PRESSURE: 108 MMHG | HEART RATE: 63 BPM

## 2020-05-01 PROCEDURE — S9126 HOSPICE CARE, IN THE HOME, P: HCPCS

## 2020-05-01 PROCEDURE — G0299 HHS/HOSPICE OF RN EA 15 MIN: HCPCS

## 2020-05-01 SDOH — ECONOMIC STABILITY: HOUSING INSECURITY: EVIDENCE OF SMOKING MATERIAL: 0

## 2020-05-01 ASSESSMENT — ENCOUNTER SYMPTOMS
BOWEL PATTERN NORMAL: 1
SLEEP QUALITY: FAIR
LAST BOWEL MOVEMENT: 65498
STOOL FREQUENCY: DAILY

## 2020-05-02 PROCEDURE — S9126 HOSPICE CARE, IN THE HOME, P: HCPCS

## 2020-05-03 PROCEDURE — S9126 HOSPICE CARE, IN THE HOME, P: HCPCS

## 2020-05-04 PROCEDURE — S9126 HOSPICE CARE, IN THE HOME, P: HCPCS

## 2020-05-05 PROCEDURE — S9126 HOSPICE CARE, IN THE HOME, P: HCPCS

## 2020-05-06 PROCEDURE — S9126 HOSPICE CARE, IN THE HOME, P: HCPCS

## 2020-05-07 ENCOUNTER — HOME CARE VISIT (OUTPATIENT)
Dept: HOSPICE | Facility: HOSPICE | Age: 85
End: 2020-05-07
Payer: MEDICARE

## 2020-05-07 PROCEDURE — S9126 HOSPICE CARE, IN THE HOME, P: HCPCS

## 2020-05-08 ENCOUNTER — HOME CARE VISIT (OUTPATIENT)
Dept: HOSPICE | Facility: HOSPICE | Age: 85
End: 2020-05-08
Payer: MEDICARE

## 2020-05-08 VITALS
TEMPERATURE: 98.1 F | DIASTOLIC BLOOD PRESSURE: 80 MMHG | SYSTOLIC BLOOD PRESSURE: 118 MMHG | RESPIRATION RATE: 26 BRPM | HEART RATE: 68 BPM

## 2020-05-08 PROCEDURE — S9126 HOSPICE CARE, IN THE HOME, P: HCPCS

## 2020-05-08 PROCEDURE — G0299 HHS/HOSPICE OF RN EA 15 MIN: HCPCS

## 2020-05-08 SDOH — ECONOMIC STABILITY: HOUSING INSECURITY: EVIDENCE OF SMOKING MATERIAL: 0

## 2020-05-08 ASSESSMENT — ENCOUNTER SYMPTOMS
LAST BOWEL MOVEMENT: 65506
STOOL FREQUENCY: DAILY
BOWEL PATTERN NORMAL: 1

## 2020-05-09 PROCEDURE — S9126 HOSPICE CARE, IN THE HOME, P: HCPCS

## 2020-05-10 PROCEDURE — S9126 HOSPICE CARE, IN THE HOME, P: HCPCS

## 2020-05-11 PROCEDURE — S9126 HOSPICE CARE, IN THE HOME, P: HCPCS

## 2020-05-12 PROCEDURE — S9126 HOSPICE CARE, IN THE HOME, P: HCPCS

## 2020-05-13 PROCEDURE — S9126 HOSPICE CARE, IN THE HOME, P: HCPCS

## 2020-05-14 ENCOUNTER — HOME CARE VISIT (OUTPATIENT)
Dept: HOSPICE | Facility: HOSPICE | Age: 85
End: 2020-05-14
Payer: MEDICARE

## 2020-05-14 VITALS
HEART RATE: 58 BPM | TEMPERATURE: 98.2 F | SYSTOLIC BLOOD PRESSURE: 102 MMHG | DIASTOLIC BLOOD PRESSURE: 62 MMHG | RESPIRATION RATE: 22 BRPM | OXYGEN SATURATION: 93 %

## 2020-05-14 PROCEDURE — S9126 HOSPICE CARE, IN THE HOME, P: HCPCS

## 2020-05-14 PROCEDURE — G0299 HHS/HOSPICE OF RN EA 15 MIN: HCPCS

## 2020-05-14 SDOH — ECONOMIC STABILITY: HOUSING INSECURITY: EVIDENCE OF SMOKING MATERIAL: 0

## 2020-05-14 ASSESSMENT — ENCOUNTER SYMPTOMS
STOOL FREQUENCY: DAILY
LAST BOWEL MOVEMENT: 65511
BOWEL PATTERN NORMAL: 1
SLEEP QUALITY: FAIR

## 2020-05-15 PROCEDURE — S9126 HOSPICE CARE, IN THE HOME, P: HCPCS

## 2020-05-16 PROCEDURE — S9126 HOSPICE CARE, IN THE HOME, P: HCPCS

## 2020-05-17 PROCEDURE — S9126 HOSPICE CARE, IN THE HOME, P: HCPCS

## 2020-05-18 PROCEDURE — S9126 HOSPICE CARE, IN THE HOME, P: HCPCS

## 2020-05-19 PROCEDURE — 6650990 HC HOSPICE AND HOME CARE RX REV CODE 0250

## 2020-05-19 PROCEDURE — S9126 HOSPICE CARE, IN THE HOME, P: HCPCS

## 2020-05-20 PROCEDURE — S9126 HOSPICE CARE, IN THE HOME, P: HCPCS

## 2020-05-21 PROCEDURE — S9126 HOSPICE CARE, IN THE HOME, P: HCPCS

## 2020-05-22 ENCOUNTER — HOME CARE VISIT (OUTPATIENT)
Dept: HOSPICE | Facility: HOSPICE | Age: 85
End: 2020-05-22
Payer: MEDICARE

## 2020-05-22 PROCEDURE — G0299 HHS/HOSPICE OF RN EA 15 MIN: HCPCS

## 2020-05-22 PROCEDURE — S9126 HOSPICE CARE, IN THE HOME, P: HCPCS

## 2020-05-23 PROCEDURE — S9126 HOSPICE CARE, IN THE HOME, P: HCPCS

## 2020-05-23 SDOH — ECONOMIC STABILITY: HOUSING INSECURITY: EVIDENCE OF SMOKING MATERIAL: 0

## 2020-05-23 ASSESSMENT — ENCOUNTER SYMPTOMS
BOWEL PATTERN NORMAL: 1
LAST BOWEL MOVEMENT: 65520
STOOL FREQUENCY: DAILY

## 2020-05-24 PROCEDURE — S9126 HOSPICE CARE, IN THE HOME, P: HCPCS

## 2020-05-25 PROCEDURE — S9126 HOSPICE CARE, IN THE HOME, P: HCPCS

## 2020-05-26 PROCEDURE — S9126 HOSPICE CARE, IN THE HOME, P: HCPCS

## 2020-05-27 PROCEDURE — S9126 HOSPICE CARE, IN THE HOME, P: HCPCS

## 2020-05-28 PROCEDURE — S9126 HOSPICE CARE, IN THE HOME, P: HCPCS

## 2020-05-29 ENCOUNTER — HOME CARE VISIT (OUTPATIENT)
Dept: HOSPICE | Facility: HOSPICE | Age: 85
End: 2020-05-29
Payer: MEDICARE

## 2020-05-29 VITALS — SYSTOLIC BLOOD PRESSURE: 108 MMHG | RESPIRATION RATE: 18 BRPM | DIASTOLIC BLOOD PRESSURE: 68 MMHG

## 2020-05-29 PROCEDURE — G0299 HHS/HOSPICE OF RN EA 15 MIN: HCPCS

## 2020-05-29 PROCEDURE — S9126 HOSPICE CARE, IN THE HOME, P: HCPCS

## 2020-05-29 ASSESSMENT — ENCOUNTER SYMPTOMS
SLEEP QUALITY: FAIR
STOOL FREQUENCY: DAILY
BOWEL PATTERN NORMAL: 1
LAST BOWEL MOVEMENT: 65523

## 2020-05-30 PROCEDURE — S9126 HOSPICE CARE, IN THE HOME, P: HCPCS

## 2020-05-31 PROCEDURE — S9126 HOSPICE CARE, IN THE HOME, P: HCPCS

## 2020-06-01 PROCEDURE — S9126 HOSPICE CARE, IN THE HOME, P: HCPCS

## 2020-06-02 PROCEDURE — S9126 HOSPICE CARE, IN THE HOME, P: HCPCS

## 2020-06-03 ENCOUNTER — HOME CARE VISIT (OUTPATIENT)
Dept: HOSPICE | Facility: HOSPICE | Age: 85
End: 2020-06-03
Payer: MEDICARE

## 2020-06-03 PROCEDURE — S9126 HOSPICE CARE, IN THE HOME, P: HCPCS

## 2020-06-04 ENCOUNTER — HOME CARE VISIT (OUTPATIENT)
Dept: HOSPICE | Facility: HOSPICE | Age: 85
End: 2020-06-04
Payer: MEDICARE

## 2020-06-04 VITALS
HEART RATE: 60 BPM | OXYGEN SATURATION: 94 % | TEMPERATURE: 98.7 F | RESPIRATION RATE: 26 BRPM | DIASTOLIC BLOOD PRESSURE: 62 MMHG | SYSTOLIC BLOOD PRESSURE: 120 MMHG

## 2020-06-04 PROCEDURE — S9126 HOSPICE CARE, IN THE HOME, P: HCPCS

## 2020-06-04 PROCEDURE — G0299 HHS/HOSPICE OF RN EA 15 MIN: HCPCS

## 2020-06-04 SDOH — ECONOMIC STABILITY: HOUSING INSECURITY: EVIDENCE OF SMOKING MATERIAL: 0

## 2020-06-04 ASSESSMENT — ENCOUNTER SYMPTOMS
BOWEL PATTERN NORMAL: 1
STOOL FREQUENCY: DAILY
LAST BOWEL MOVEMENT: 65532
SLEEP QUALITY: FAIR

## 2020-06-05 PROCEDURE — S9126 HOSPICE CARE, IN THE HOME, P: HCPCS

## 2020-06-06 PROCEDURE — S9126 HOSPICE CARE, IN THE HOME, P: HCPCS

## 2020-06-07 PROCEDURE — S9126 HOSPICE CARE, IN THE HOME, P: HCPCS

## 2020-06-08 PROCEDURE — S9126 HOSPICE CARE, IN THE HOME, P: HCPCS

## 2020-06-09 PROCEDURE — S9126 HOSPICE CARE, IN THE HOME, P: HCPCS

## 2020-06-10 PROCEDURE — S9126 HOSPICE CARE, IN THE HOME, P: HCPCS

## 2020-06-11 ENCOUNTER — HOME CARE VISIT (OUTPATIENT)
Dept: HOSPICE | Facility: HOSPICE | Age: 85
End: 2020-06-11
Payer: MEDICARE

## 2020-06-11 VITALS
OXYGEN SATURATION: 95 % | TEMPERATURE: 98.4 F | SYSTOLIC BLOOD PRESSURE: 122 MMHG | DIASTOLIC BLOOD PRESSURE: 80 MMHG | RESPIRATION RATE: 22 BRPM | HEART RATE: 58 BPM

## 2020-06-11 PROCEDURE — G0299 HHS/HOSPICE OF RN EA 15 MIN: HCPCS

## 2020-06-11 PROCEDURE — S9126 HOSPICE CARE, IN THE HOME, P: HCPCS

## 2020-06-11 SDOH — ECONOMIC STABILITY: HOUSING INSECURITY: EVIDENCE OF SMOKING MATERIAL: 0

## 2020-06-11 ASSESSMENT — ENCOUNTER SYMPTOMS
STOOL FREQUENCY: DAILY
BOWEL PATTERN NORMAL: 1
LAST BOWEL MOVEMENT: 65541

## 2020-06-12 PROCEDURE — S9126 HOSPICE CARE, IN THE HOME, P: HCPCS

## 2020-06-13 PROCEDURE — S9126 HOSPICE CARE, IN THE HOME, P: HCPCS

## 2020-06-14 PROCEDURE — S9126 HOSPICE CARE, IN THE HOME, P: HCPCS

## 2020-06-15 PROCEDURE — S9126 HOSPICE CARE, IN THE HOME, P: HCPCS

## 2020-06-16 PROCEDURE — S9126 HOSPICE CARE, IN THE HOME, P: HCPCS

## 2020-06-17 PROCEDURE — S9126 HOSPICE CARE, IN THE HOME, P: HCPCS

## 2020-06-18 PROCEDURE — S9126 HOSPICE CARE, IN THE HOME, P: HCPCS

## 2020-06-19 ENCOUNTER — HOME CARE VISIT (OUTPATIENT)
Dept: HOSPICE | Facility: HOSPICE | Age: 85
End: 2020-06-19
Payer: MEDICARE

## 2020-06-19 VITALS
DIASTOLIC BLOOD PRESSURE: 60 MMHG | SYSTOLIC BLOOD PRESSURE: 120 MMHG | RESPIRATION RATE: 26 BRPM | HEART RATE: 76 BPM | TEMPERATURE: 97.5 F

## 2020-06-19 PROCEDURE — S9126 HOSPICE CARE, IN THE HOME, P: HCPCS

## 2020-06-19 PROCEDURE — G0299 HHS/HOSPICE OF RN EA 15 MIN: HCPCS

## 2020-06-19 ASSESSMENT — ENCOUNTER SYMPTOMS
STOOL FREQUENCY: DAILY
FORGETFULNESS: 1
LAST BOWEL MOVEMENT: 65549
FATIGUES EASILY: 1
FATIGUE: 1
SLEEP QUALITY: FAIR
DESCRIPTION OF MEMORY LOSS: SHORT TERM
BOWEL PATTERN NORMAL: 1

## 2020-06-19 ASSESSMENT — ACTIVITIES OF DAILY LIVING (ADL): MONEY MANAGEMENT (EXPENSES/BILLS): TOTALLY DEPENDENT

## 2020-06-20 PROCEDURE — S9126 HOSPICE CARE, IN THE HOME, P: HCPCS

## 2020-06-21 PROCEDURE — S9126 HOSPICE CARE, IN THE HOME, P: HCPCS

## 2020-06-22 PROCEDURE — 6650990 HC HOSPICE AND HOME CARE RX REV CODE 0250

## 2020-06-22 PROCEDURE — S9126 HOSPICE CARE, IN THE HOME, P: HCPCS

## 2020-06-23 PROCEDURE — S9126 HOSPICE CARE, IN THE HOME, P: HCPCS

## 2020-06-24 PROCEDURE — S9126 HOSPICE CARE, IN THE HOME, P: HCPCS

## 2020-06-25 PROCEDURE — S9126 HOSPICE CARE, IN THE HOME, P: HCPCS

## 2020-06-26 ENCOUNTER — HOME CARE VISIT (OUTPATIENT)
Dept: HOSPICE | Facility: HOSPICE | Age: 85
End: 2020-06-26
Payer: MEDICARE

## 2020-06-26 PROCEDURE — S9126 HOSPICE CARE, IN THE HOME, P: HCPCS

## 2020-06-26 PROCEDURE — G0299 HHS/HOSPICE OF RN EA 15 MIN: HCPCS

## 2020-06-26 ASSESSMENT — ENCOUNTER SYMPTOMS
STOOL FREQUENCY: DAILY
SLEEP QUALITY: FAIR
FATIGUES EASILY: 1
BOWEL PATTERN NORMAL: 1
LAST BOWEL MOVEMENT: 65556

## 2020-06-27 VITALS
HEART RATE: 80 BPM | SYSTOLIC BLOOD PRESSURE: 120 MMHG | TEMPERATURE: 97.3 F | RESPIRATION RATE: 20 BRPM | DIASTOLIC BLOOD PRESSURE: 60 MMHG

## 2020-06-27 PROCEDURE — S9126 HOSPICE CARE, IN THE HOME, P: HCPCS

## 2020-06-27 ASSESSMENT — ENCOUNTER SYMPTOMS
FATIGUE: 1
DESCRIPTION OF MEMORY LOSS: SHORT TERM
FORGETFULNESS: 1

## 2020-06-27 ASSESSMENT — ACTIVITIES OF DAILY LIVING (ADL): MONEY MANAGEMENT (EXPENSES/BILLS): TOTALLY DEPENDENT

## 2020-06-28 PROCEDURE — S9126 HOSPICE CARE, IN THE HOME, P: HCPCS

## 2020-06-29 PROCEDURE — S9126 HOSPICE CARE, IN THE HOME, P: HCPCS

## 2020-06-30 PROCEDURE — S9126 HOSPICE CARE, IN THE HOME, P: HCPCS

## 2020-07-01 PROCEDURE — S9126 HOSPICE CARE, IN THE HOME, P: HCPCS

## 2020-07-02 ENCOUNTER — HOME CARE VISIT (OUTPATIENT)
Dept: HOSPICE | Facility: HOSPICE | Age: 85
End: 2020-07-02
Payer: MEDICARE

## 2020-07-02 VITALS
RESPIRATION RATE: 24 BRPM | SYSTOLIC BLOOD PRESSURE: 118 MMHG | TEMPERATURE: 98.1 F | DIASTOLIC BLOOD PRESSURE: 70 MMHG | HEART RATE: 78 BPM

## 2020-07-02 PROCEDURE — S9126 HOSPICE CARE, IN THE HOME, P: HCPCS

## 2020-07-02 PROCEDURE — G0299 HHS/HOSPICE OF RN EA 15 MIN: HCPCS

## 2020-07-02 ASSESSMENT — ACTIVITIES OF DAILY LIVING (ADL): MONEY MANAGEMENT (EXPENSES/BILLS): TOTALLY DEPENDENT

## 2020-07-02 ASSESSMENT — ENCOUNTER SYMPTOMS
FATIGUE: 1
BOWEL PATTERN NORMAL: 1
SLEEP QUALITY: FAIR
FATIGUES EASILY: 1
FORGETFULNESS: 1
LAST BOWEL MOVEMENT: 65561
STOOL FREQUENCY: DAILY
DESCRIPTION OF MEMORY LOSS: SHORT TERM

## 2020-07-03 PROCEDURE — S9126 HOSPICE CARE, IN THE HOME, P: HCPCS

## 2020-07-04 PROCEDURE — S9126 HOSPICE CARE, IN THE HOME, P: HCPCS

## 2020-07-05 PROCEDURE — S9126 HOSPICE CARE, IN THE HOME, P: HCPCS

## 2020-07-06 PROCEDURE — S9126 HOSPICE CARE, IN THE HOME, P: HCPCS

## 2020-07-07 PROCEDURE — S9126 HOSPICE CARE, IN THE HOME, P: HCPCS

## 2020-07-08 PROCEDURE — S9126 HOSPICE CARE, IN THE HOME, P: HCPCS

## 2020-07-09 PROCEDURE — S9126 HOSPICE CARE, IN THE HOME, P: HCPCS

## 2020-07-10 ENCOUNTER — HOME CARE VISIT (OUTPATIENT)
Dept: HOSPICE | Facility: HOSPICE | Age: 85
End: 2020-07-10
Payer: MEDICARE

## 2020-07-10 VITALS
HEART RATE: 54 BPM | OXYGEN SATURATION: 97 % | SYSTOLIC BLOOD PRESSURE: 102 MMHG | DIASTOLIC BLOOD PRESSURE: 68 MMHG | RESPIRATION RATE: 24 BRPM | TEMPERATURE: 98.8 F

## 2020-07-10 PROCEDURE — G0299 HHS/HOSPICE OF RN EA 15 MIN: HCPCS

## 2020-07-10 PROCEDURE — S9126 HOSPICE CARE, IN THE HOME, P: HCPCS

## 2020-07-10 PROCEDURE — 6650990 HC HOSPICE AND HOME CARE RX REV CODE 0250

## 2020-07-10 SDOH — ECONOMIC STABILITY: HOUSING INSECURITY: EVIDENCE OF SMOKING MATERIAL: 0

## 2020-07-10 ASSESSMENT — ENCOUNTER SYMPTOMS
DESCRIPTION OF MEMORY LOSS: SHORT TERM
BOWEL PATTERN NORMAL: 1
FATIGUE: 1
STOOL FREQUENCY: DAILY
SLEEP QUALITY: FAIR
LAST BOWEL MOVEMENT: 65570
FORGETFULNESS: 1

## 2020-07-11 PROCEDURE — S9126 HOSPICE CARE, IN THE HOME, P: HCPCS

## 2020-07-12 PROCEDURE — S9126 HOSPICE CARE, IN THE HOME, P: HCPCS

## 2020-07-13 PROCEDURE — S9126 HOSPICE CARE, IN THE HOME, P: HCPCS

## 2020-07-14 PROCEDURE — S9126 HOSPICE CARE, IN THE HOME, P: HCPCS

## 2020-07-15 PROCEDURE — S9126 HOSPICE CARE, IN THE HOME, P: HCPCS

## 2020-07-16 PROCEDURE — S9126 HOSPICE CARE, IN THE HOME, P: HCPCS

## 2020-07-17 ENCOUNTER — HOME CARE VISIT (OUTPATIENT)
Dept: HOSPICE | Facility: HOSPICE | Age: 85
End: 2020-07-17
Payer: MEDICARE

## 2020-07-17 VITALS
TEMPERATURE: 98.2 F | DIASTOLIC BLOOD PRESSURE: 70 MMHG | HEART RATE: 62 BPM | SYSTOLIC BLOOD PRESSURE: 122 MMHG | RESPIRATION RATE: 24 BRPM | OXYGEN SATURATION: 93 %

## 2020-07-17 PROCEDURE — G0299 HHS/HOSPICE OF RN EA 15 MIN: HCPCS

## 2020-07-17 PROCEDURE — S9126 HOSPICE CARE, IN THE HOME, P: HCPCS

## 2020-07-17 SDOH — ECONOMIC STABILITY: HOUSING INSECURITY: EVIDENCE OF SMOKING MATERIAL: 0

## 2020-07-17 ASSESSMENT — ENCOUNTER SYMPTOMS
STOOL FREQUENCY: DAILY
SLEEP QUALITY: FAIR
LAST BOWEL MOVEMENT: 65575
FATIGUES EASILY: 1
BOWEL PATTERN NORMAL: 1

## 2020-07-18 PROCEDURE — S9126 HOSPICE CARE, IN THE HOME, P: HCPCS

## 2020-07-19 PROCEDURE — S9126 HOSPICE CARE, IN THE HOME, P: HCPCS

## 2020-07-20 PROCEDURE — 6650990 HC HOSPICE AND HOME CARE RX REV CODE 0250

## 2020-07-20 PROCEDURE — S9126 HOSPICE CARE, IN THE HOME, P: HCPCS

## 2020-07-21 PROCEDURE — S9126 HOSPICE CARE, IN THE HOME, P: HCPCS

## 2020-07-22 PROCEDURE — S9126 HOSPICE CARE, IN THE HOME, P: HCPCS

## 2020-07-23 PROCEDURE — S9126 HOSPICE CARE, IN THE HOME, P: HCPCS

## 2020-07-24 ENCOUNTER — HOME CARE VISIT (OUTPATIENT)
Dept: HOSPICE | Facility: HOSPICE | Age: 85
End: 2020-07-24
Payer: MEDICARE

## 2020-07-24 PROCEDURE — S9126 HOSPICE CARE, IN THE HOME, P: HCPCS

## 2020-07-24 PROCEDURE — G0299 HHS/HOSPICE OF RN EA 15 MIN: HCPCS

## 2020-07-24 SDOH — ECONOMIC STABILITY: HOUSING INSECURITY: EVIDENCE OF SMOKING MATERIAL: 0

## 2020-07-24 ASSESSMENT — ENCOUNTER SYMPTOMS: SLEEP QUALITY: FAIR

## 2020-07-25 VITALS
HEART RATE: 68 BPM | OXYGEN SATURATION: 93 % | SYSTOLIC BLOOD PRESSURE: 112 MMHG | TEMPERATURE: 98.8 F | DIASTOLIC BLOOD PRESSURE: 70 MMHG | RESPIRATION RATE: 24 BRPM

## 2020-07-25 PROCEDURE — S9126 HOSPICE CARE, IN THE HOME, P: HCPCS

## 2020-07-25 ASSESSMENT — ENCOUNTER SYMPTOMS
STOOL FREQUENCY: DAILY
BOWEL PATTERN NORMAL: 1
LAST BOWEL MOVEMENT: 65583

## 2020-07-26 PROCEDURE — S9126 HOSPICE CARE, IN THE HOME, P: HCPCS

## 2020-07-27 PROCEDURE — S9126 HOSPICE CARE, IN THE HOME, P: HCPCS

## 2020-07-28 PROCEDURE — S9126 HOSPICE CARE, IN THE HOME, P: HCPCS

## 2020-07-29 ENCOUNTER — HOME CARE VISIT (OUTPATIENT)
Dept: HOSPICE | Facility: HOSPICE | Age: 85
End: 2020-07-29
Payer: MEDICARE

## 2020-07-29 PROCEDURE — S9126 HOSPICE CARE, IN THE HOME, P: HCPCS

## 2020-07-29 PROCEDURE — 6650990 HC HOSPICE AND HOME CARE RX REV CODE 0250

## 2020-07-30 ENCOUNTER — HOME CARE VISIT (OUTPATIENT)
Dept: HOSPICE | Facility: HOSPICE | Age: 85
End: 2020-07-30
Payer: MEDICARE

## 2020-07-30 PROCEDURE — S9126 HOSPICE CARE, IN THE HOME, P: HCPCS

## 2020-07-31 ENCOUNTER — HOME CARE VISIT (OUTPATIENT)
Dept: HOSPICE | Facility: HOSPICE | Age: 85
End: 2020-07-31
Payer: MEDICARE

## 2020-07-31 PROCEDURE — S9126 HOSPICE CARE, IN THE HOME, P: HCPCS

## 2020-07-31 PROCEDURE — G0299 HHS/HOSPICE OF RN EA 15 MIN: HCPCS

## 2020-08-01 PROCEDURE — S9126 HOSPICE CARE, IN THE HOME, P: HCPCS

## 2020-08-02 VITALS
RESPIRATION RATE: 26 BRPM | OXYGEN SATURATION: 96 % | TEMPERATURE: 98.2 F | SYSTOLIC BLOOD PRESSURE: 120 MMHG | HEART RATE: 58 BPM | DIASTOLIC BLOOD PRESSURE: 62 MMHG

## 2020-08-02 PROCEDURE — S9126 HOSPICE CARE, IN THE HOME, P: HCPCS

## 2020-08-02 SDOH — ECONOMIC STABILITY: HOUSING INSECURITY: EVIDENCE OF SMOKING MATERIAL: 0

## 2020-08-02 ASSESSMENT — ENCOUNTER SYMPTOMS
SLEEP QUALITY: FAIR
BOWEL PATTERN NORMAL: 1
STOOL FREQUENCY: DAILY
LAST BOWEL MOVEMENT: 65591

## 2020-08-02 ASSESSMENT — ACTIVITIES OF DAILY LIVING (ADL): MONEY MANAGEMENT (EXPENSES/BILLS): TOTALLY DEPENDENT

## 2020-08-03 ENCOUNTER — HOME CARE VISIT (OUTPATIENT)
Dept: HOSPICE | Facility: HOSPICE | Age: 85
End: 2020-08-03
Payer: MEDICARE

## 2020-08-03 PROCEDURE — S9126 HOSPICE CARE, IN THE HOME, P: HCPCS

## 2020-08-04 PROCEDURE — S9126 HOSPICE CARE, IN THE HOME, P: HCPCS

## 2020-08-05 ENCOUNTER — HOME CARE VISIT (OUTPATIENT)
Dept: HOSPICE | Facility: HOSPICE | Age: 85
End: 2020-08-05
Payer: MEDICARE

## 2020-08-05 PROCEDURE — G0155 HHCP-SVS OF CSW,EA 15 MIN: HCPCS

## 2020-08-05 PROCEDURE — S9126 HOSPICE CARE, IN THE HOME, P: HCPCS

## 2020-08-06 ENCOUNTER — HOME CARE VISIT (OUTPATIENT)
Dept: HOSPICE | Facility: HOSPICE | Age: 85
End: 2020-08-06
Payer: MEDICARE

## 2020-08-06 VITALS
HEART RATE: 56 BPM | DIASTOLIC BLOOD PRESSURE: 68 MMHG | RESPIRATION RATE: 24 BRPM | TEMPERATURE: 96.9 F | SYSTOLIC BLOOD PRESSURE: 122 MMHG | OXYGEN SATURATION: 95 %

## 2020-08-06 PROCEDURE — S9126 HOSPICE CARE, IN THE HOME, P: HCPCS

## 2020-08-06 PROCEDURE — G0299 HHS/HOSPICE OF RN EA 15 MIN: HCPCS

## 2020-08-06 PROCEDURE — 6650990 HC HOSPICE AND HOME CARE RX REV CODE 0250

## 2020-08-06 SDOH — ECONOMIC STABILITY: HOUSING INSECURITY: EVIDENCE OF SMOKING MATERIAL: 0

## 2020-08-06 ASSESSMENT — ENCOUNTER SYMPTOMS
LAST BOWEL MOVEMENT: 65596
BOWEL PATTERN NORMAL: 1
SLEEP QUALITY: ADEQUATE
STOOL FREQUENCY: DAILY

## 2020-08-07 ENCOUNTER — HOME CARE VISIT (OUTPATIENT)
Dept: HOSPICE | Facility: HOSPICE | Age: 85
End: 2020-08-07
Payer: MEDICARE

## 2020-08-07 PROCEDURE — S9126 HOSPICE CARE, IN THE HOME, P: HCPCS

## 2020-08-08 PROCEDURE — S9126 HOSPICE CARE, IN THE HOME, P: HCPCS

## 2020-08-09 PROCEDURE — S9126 HOSPICE CARE, IN THE HOME, P: HCPCS

## 2021-01-14 DIAGNOSIS — Z23 NEED FOR VACCINATION: ICD-10-CM

## 2021-05-12 ENCOUNTER — HOSPITAL ENCOUNTER (OUTPATIENT)
Facility: MEDICAL CENTER | Age: 86
End: 2021-05-12
Attending: PHYSICIAN ASSISTANT
Payer: MEDICARE

## 2021-10-05 ENCOUNTER — HOSPITAL ENCOUNTER (OUTPATIENT)
Facility: MEDICAL CENTER | Age: 86
End: 2021-10-05
Attending: NURSE PRACTITIONER
Payer: MEDICARE

## 2021-10-05 PROCEDURE — U0003 INFECTIOUS AGENT DETECTION BY NUCLEIC ACID (DNA OR RNA); SEVERE ACUTE RESPIRATORY SYNDROME CORONAVIRUS 2 (SARS-COV-2) (CORONAVIRUS DISEASE [COVID-19]), AMPLIFIED PROBE TECHNIQUE, MAKING USE OF HIGH THROUGHPUT TECHNOLOGIES AS DESCRIBED BY CMS-2020-01-R: HCPCS

## 2021-10-05 PROCEDURE — U0005 INFEC AGEN DETEC AMPLI PROBE: HCPCS

## 2021-10-06 LAB — COVID ORDER STATUS COVID19: NORMAL

## 2021-10-07 LAB
SARS-COV-2 RNA RESP QL NAA+PROBE: NOTDETECTED
SPECIMEN SOURCE: NORMAL

## (undated) DEVICE — STAPLER SKIN DISP - (6/BX 10BX/CA) VISISTAT

## (undated) DEVICE — HEAD HOLDER JUNIOR/ADULT

## (undated) DEVICE — BIT DRILL SHORT 4.2MM X 155MM (4TX2=8)

## (undated) DEVICE — DRAPE LARGE 3 QUARTER - (20/CA)

## (undated) DEVICE — GLOVE BIOGEL PI ORTHO SZ 8.5 PF LF (40/BX)

## (undated) DEVICE — SUTURE GENERAL

## (undated) DEVICE — ELECTRODE 850 FOAM ADHESIVE - HYDROGEL RADIOTRNSPRNT (50/PK)

## (undated) DEVICE — BAG CASSETTE COVER STERILE - 23 X 24 1/2  (25/BX 50/CA)

## (undated) DEVICE — GLOVE BIOGEL INDICATOR SZ 8 SURGICAL PF LTX - (50/BX 4BX/CA)

## (undated) DEVICE — PACK MAJOR ORTHO - (2EA/CA)

## (undated) DEVICE — DRAPE C-ARM LARGE 41IN X 74 IN - (10/BX 2BX/CA)

## (undated) DEVICE — DRAPE U ORTHOPEDIC - (10/BX)

## (undated) DEVICE — TUBE CONNECT SUCTION CLEAR 120 X 1/4" (50EA/CA)"

## (undated) DEVICE — SET EXTENSION WITH 2 PORTS (48EA/CA) ***PART #2C8610 IS A SUBSTITUTE*****

## (undated) DEVICE — ELECTRODE DUAL RETURN W/ CORD - (50/PK)

## (undated) DEVICE — KIT ROOM DECONTAMINATION

## (undated) DEVICE — SUTURE 2-0 VICRYL PLUS CT-1 - 8 X 18 INCH(12/BX)

## (undated) DEVICE — GUIDE PIN CALIBRATED (5EA/PK) (4TX6=24)

## (undated) DEVICE — TUBING CLEARLINK DUO-VENT - C-FLO (48EA/CA)

## (undated) DEVICE — WRAP CO-FLEX 4IN X 5YD STERIL - SELF-ADHERENT (18/CA)

## (undated) DEVICE — GLOVE BIOGEL INDICATOR SZ 8.5 SURGICAL PF LTX - (50/BX 4BX/CA)

## (undated) DEVICE — KIT ANESTHESIA W/CIRCUIT & 3/LT BAG W/FILTER (20EA/CA)

## (undated) DEVICE — ROD GUIDE BALL TIP 3.0MM X 1000MM

## (undated) DEVICE — CANISTER SUCTION 3000ML MECHANICAL FILTER AUTO SHUTOFF MEDI-VAC NONSTERILE LF DISP  (40EA/CA)

## (undated) DEVICE — GLOVE BIOGEL SZ 6 PF LATEX - (50EA/BX 4BX/CA)

## (undated) DEVICE — GOWN SURGEONS X-LARGE - DISP. (30/CA)

## (undated) DEVICE — SET LEADWIRE 5 LEAD BEDSIDE DISPOSABLE ECG (1SET OF 5/EA)

## (undated) DEVICE — PROTECTOR ULNA NERVE - (36PR/CA)

## (undated) DEVICE — DRESSING TRANSPARENT FILM TEGADERM 4 X 4.75" (50EA/BX)"

## (undated) DEVICE — SENSOR SPO2 NEO LNCS ADHESIVE (20/BX) SEE USER NOTES

## (undated) DEVICE — LACTATED RINGERS INJ 1000 ML - (14EA/CA 60CA/PF)

## (undated) DEVICE — DRESSING XEROFORM 1X8 - (50/BX 4BX/CA)

## (undated) DEVICE — SLEEVE, VASO, THIGH, MED

## (undated) DEVICE — GLOVE BIOGEL PI INDICATOR SZ 6.5 SURGICAL PF LF - (50/BX 4BX/CA)

## (undated) DEVICE — SUCTION INSTRUMENT YANKAUER BULBOUS TIP W/O VENT (50EA/CA)

## (undated) DEVICE — CHLORAPREP 26 ML APPLICATOR - ORANGE TINT(25/CA)

## (undated) DEVICE — GLOVE BIOGEL SZ 8 SURGICAL PF LTX - (50PR/BX 4BX/CA)

## (undated) DEVICE — MASK ANESTHESIA ADULT  - (100/CA)

## (undated) DEVICE — DRAPE SURGICAL U 77X120 - (10/CA)

## (undated) DEVICE — GOWN WARMING STANDARD FLEX - (30/CA)

## (undated) DEVICE — NEPTUNE 4 PORT MANIFOLD - (20/PK)

## (undated) DEVICE — GLOVE BIOGEL ECLIPSE PF LATEX SIZE 8.0  (50PR/BX)

## (undated) DEVICE — ROD GUIDE R-T TRIGEN 3 X 1000 (1EA)

## (undated) DEVICE — TOWELS CLOTH SURGICAL - (4/PK 20PK/CA)